# Patient Record
Sex: FEMALE | Race: WHITE | NOT HISPANIC OR LATINO | Employment: FULL TIME | URBAN - METROPOLITAN AREA
[De-identification: names, ages, dates, MRNs, and addresses within clinical notes are randomized per-mention and may not be internally consistent; named-entity substitution may affect disease eponyms.]

---

## 2017-06-27 ENCOUNTER — TELEPHONE (OUTPATIENT)
Dept: OBSTETRICS AND GYNECOLOGY | Facility: CLINIC | Age: 47
End: 2017-06-27

## 2017-06-28 ENCOUNTER — LAB VISIT (OUTPATIENT)
Dept: LAB | Facility: OTHER | Age: 47
End: 2017-06-28
Attending: OBSTETRICS & GYNECOLOGY
Payer: COMMERCIAL

## 2017-06-28 ENCOUNTER — OFFICE VISIT (OUTPATIENT)
Dept: OBSTETRICS AND GYNECOLOGY | Facility: CLINIC | Age: 47
End: 2017-06-28
Attending: OBSTETRICS & GYNECOLOGY
Payer: COMMERCIAL

## 2017-06-28 VITALS
SYSTOLIC BLOOD PRESSURE: 98 MMHG | WEIGHT: 144.19 LBS | BODY MASS INDEX: 21.85 KG/M2 | HEIGHT: 68 IN | DIASTOLIC BLOOD PRESSURE: 62 MMHG

## 2017-06-28 DIAGNOSIS — Z12.31 ENCOUNTER FOR SCREENING MAMMOGRAM FOR MALIGNANT NEOPLASM OF BREAST: Primary | ICD-10-CM

## 2017-06-28 DIAGNOSIS — Z20.2 POSSIBLE EXPOSURE TO STD: ICD-10-CM

## 2017-06-28 DIAGNOSIS — Z01.419 ENCOUNTER FOR GYNECOLOGICAL EXAMINATION (GENERAL) (ROUTINE) WITHOUT ABNORMAL FINDINGS: ICD-10-CM

## 2017-06-28 LAB
C TRACH DNA SPEC QL NAA+PROBE: NOT DETECTED
CANDIDA RRNA VAG QL PROBE: NEGATIVE
G VAGINALIS RRNA GENITAL QL PROBE: POSITIVE
N GONORRHOEA DNA SPEC QL NAA+PROBE: NOT DETECTED
RPR SER QL: NORMAL
T VAGINALIS RRNA GENITAL QL PROBE: NEGATIVE

## 2017-06-28 PROCEDURE — 86695 HERPES SIMPLEX TYPE 1 TEST: CPT

## 2017-06-28 PROCEDURE — 86592 SYPHILIS TEST NON-TREP QUAL: CPT

## 2017-06-28 PROCEDURE — 86703 HIV-1/HIV-2 1 RESULT ANTBDY: CPT

## 2017-06-28 PROCEDURE — 99396 PREV VISIT EST AGE 40-64: CPT | Mod: S$GLB,,, | Performed by: OBSTETRICS & GYNECOLOGY

## 2017-06-28 PROCEDURE — 99999 PR PBB SHADOW E&M-EST. PATIENT-LVL III: CPT | Mod: PBBFAC,,, | Performed by: OBSTETRICS & GYNECOLOGY

## 2017-06-28 PROCEDURE — 87340 HEPATITIS B SURFACE AG IA: CPT

## 2017-06-28 PROCEDURE — 36415 COLL VENOUS BLD VENIPUNCTURE: CPT

## 2017-06-28 RX ORDER — VARENICLINE TARTRATE 0.5 (11)-1
KIT ORAL
COMMUNITY
End: 2022-03-22

## 2017-06-28 NOTE — PROGRESS NOTES
Subjective:       Patient ID: Surjit Carranza is a 47 y.o. female.    Chief Complaint:  Well Woman (Last annual 2016 pap/hpv negative, Last mmg 2016 Bi-RADS 2 ) and STD CHECK (pt wanting std screening cultures and lab work )      Patient Active Problem List   Diagnosis    Tobacco use    Herpetic ulceration of vulva       History of Present Illness  47 y.o. yo  here for annual exam. No gyn complaints. Doing well. Wants STD testing. Condom fell off twice. Otherwise doing well    Patient had a normal pap smear and HPV in 2016 at last annual visit. I explained new guidelines. Will repeat pap and HPV every 3 years. Answered all questions. Patient agrees.     Past Medical History:   Diagnosis Date    Abnormal Pap smear of cervix     years ago     Anxiety     Chlamydia     Herpes simplex virus (HSV) infection     HPV (human papilloma virus) infection        Past Surgical History:   Procedure Laterality Date    CYST REMOVAL      PELVIC LAPAROSCOPY      TUBAL LIGATION         OB History    Para Term  AB Living   1       1     SAB TAB Ectopic Multiple Live Births       1          # Outcome Date GA Lbr Rasta/2nd Weight Sex Delivery Anes PTL Lv   1 Ectopic                   Patient's last menstrual period was 2017.   Date of Last Pap: 2016    Review of Systems  Review of Systems   Constitutional: Negative for fatigue and unexpected weight change.   Respiratory: Negative for shortness of breath.    Cardiovascular: Negative for chest pain.   Gastrointestinal: Negative for abdominal pain, constipation, diarrhea, nausea and vomiting.   Genitourinary: Negative for dysuria.   Musculoskeletal: Negative for back pain.   Skin: Negative for rash.   Neurological: Negative for headaches.   Hematological: Does not bruise/bleed easily.   Psychiatric/Behavioral: Negative for behavioral problems.        Objective:   Physical Exam:   Constitutional: She is oriented to person, place, and time.  Vital signs are normal. She appears well-developed and well-nourished. No distress.        Pulmonary/Chest: She exhibits no mass. Right breast exhibits no mass, no nipple discharge, no skin change, no tenderness, no bleeding and no swelling. Left breast exhibits no mass, no nipple discharge, no skin change, no tenderness, no bleeding and no swelling. Breasts are symmetrical.        Abdominal: Soft. Normal appearance and bowel sounds are normal. She exhibits no distension and no mass. There is no tenderness. There is no rebound.     Genitourinary: Vagina normal and uterus normal. There is no rash, tenderness, lesion or injury on the right labia. There is no rash, tenderness, lesion or injury on the left labia. Uterus is not deviated, not enlarged, not fixed, not tender, not hosting fibroids and not experiencing uterine prolapse. Cervix is normal. Right adnexum displays no mass, no tenderness and no fullness. Left adnexum displays no mass, no tenderness and no fullness. No erythema, tenderness, rectocele, cystocele or unspecified prolapse of vaginal walls in the vagina. No vaginal discharge found. Cervix exhibits no motion tenderness, no discharge and no friability.           Musculoskeletal: Normal range of motion and moves all extremeties.      Lymphadenopathy:     She has no axillary adenopathy.        Right: No supraclavicular adenopathy present.        Left: No supraclavicular adenopathy present.    Neurological: She is alert and oriented to person, place, and time.    Skin: Skin is warm and dry.    Psychiatric: She has a normal mood and affect. Her behavior is normal. Judgment normal.        Assessment/ Plan:     1. Encounter for screening mammogram for malignant neoplasm of breast  Mammo Digital Screening Bilat with Tomosynthesis CAD    Mammo Digital Screening Bilat with Tomosynthesis CAD   2. Possible exposure to STD  Vaginosis Screen by DNA Probe    C. trachomatis/N. gonorrhoeae by AMP DNA Cervix    HIV-1 and  HIV-2 antibodies    Hepatitis B surface antigen    RPR    Herpes simplex type 1&2 IgG,Herpes titer   3. Encounter for gynecological examination (general) (routine) without abnormal findings         Follow-up with me in 1 year

## 2017-06-29 LAB
HBV SURFACE AG SERPL QL IA: NEGATIVE
HIV 1+2 AB+HIV1 P24 AG SERPL QL IA: NEGATIVE

## 2017-06-30 LAB
HSV1 IGG SERPL QL IA: POSITIVE
HSV2 IGG SERPL QL IA: POSITIVE

## 2017-07-03 RX ORDER — METRONIDAZOLE 500 MG/1
500 TABLET ORAL EVERY 12 HOURS
Qty: 14 TABLET | Refills: 0 | Status: SHIPPED | OUTPATIENT
Start: 2017-07-03 | End: 2017-07-10

## 2017-08-28 ENCOUNTER — HOSPITAL ENCOUNTER (OUTPATIENT)
Dept: RADIOLOGY | Facility: HOSPITAL | Age: 47
Discharge: HOME | End: 2017-08-28
Attending: OBSTETRICS & GYNECOLOGY
Payer: COMMERCIAL

## 2017-08-28 PROCEDURE — 77067 SCR MAMMO BI INCL CAD: CPT | Mod: TC

## 2017-08-28 PROCEDURE — 77063 BREAST TOMOSYNTHESIS BI: CPT | Mod: 26,,, | Performed by: RADIOLOGY

## 2017-08-28 PROCEDURE — 77067 SCR MAMMO BI INCL CAD: CPT | Mod: 26,,, | Performed by: RADIOLOGY

## 2017-09-05 ENCOUNTER — TELEPHONE (OUTPATIENT)
Dept: OBSTETRICS AND GYNECOLOGY | Facility: CLINIC | Age: 47
End: 2017-09-05

## 2017-09-05 NOTE — TELEPHONE ENCOUNTER
Pt calling for mammogram results. Pt says she got an email from [facility] that she needed follow up but never heard anything from Dr Lugo.

## 2017-09-05 NOTE — TELEPHONE ENCOUNTER
Dr. Lugo I spoke with patient informed you were out of the office until tomorrow. Advised patient you will review her results and call her tomorrow. Patient said she got an e-mail saying she needs to follow up but didn't get a call from you or the radiology department. At her mammogram appointment they did not inform her of anything. Patient would like to be called after 4pm tomorrow and if she does not answer you can leave her a detailed message.

## 2017-09-07 NOTE — TELEPHONE ENCOUNTER
I called [facility] on the [facility] ([phone]) and left a message with the mammogram teach regarding patients mammogram results.

## 2017-09-12 ENCOUNTER — TELEPHONE (OUTPATIENT)
Dept: OBSTETRICS AND GYNECOLOGY | Facility: CLINIC | Age: 47
End: 2017-09-12

## 2017-09-12 NOTE — TELEPHONE ENCOUNTER
----- Message from Marisela Lee MA sent at 9/7/2017  5:12 PM CDT -----  Call [facility] again

## 2017-09-12 NOTE — TELEPHONE ENCOUNTER
I spoke with Maira at the [facility] imaging center and she informed me that the patient automatically gets that letter because her mammogram Bi-Rad was 0. They requested patients records and they should have them Thursday to compare if after comparison she needs additional views then they will contact patient to explain why.    Patient informed at this time she does not need to go back in for additional views they will contact her if she does based on comparison. Pt understands.

## 2017-10-18 ENCOUNTER — TELEPHONE (OUTPATIENT)
Dept: OBSTETRICS AND GYNECOLOGY | Facility: CLINIC | Age: 47
End: 2017-10-18

## 2017-10-18 RX ORDER — NITROFURANTOIN 25; 75 MG/1; MG/1
100 CAPSULE ORAL 2 TIMES DAILY
Qty: 14 CAPSULE | Refills: 0 | Status: SHIPPED | OUTPATIENT
Start: 2017-10-18 | End: 2017-10-25

## 2017-10-18 NOTE — TELEPHONE ENCOUNTER
Pt thinks she has a UTI. c/o Irritation, urinary frequency, and back ache.  appt offered, declined.  requesting a rx. Advised if not better in 3 days she should be seen.     Macrobid pended

## 2017-10-18 NOTE — TELEPHONE ENCOUNTER
Dr Lugo pt calling, pt thinks she has a UTI with irritation and frequency would like something called in.Pt # [phone]

## 2018-04-09 ENCOUNTER — TELEPHONE (OUTPATIENT)
Dept: OBSTETRICS AND GYNECOLOGY | Facility: CLINIC | Age: 48
End: 2018-04-09

## 2018-04-09 NOTE — TELEPHONE ENCOUNTER
Parish pt- thinks she is going through Menopause. Had first period since November and just got one last week, that's lasted 7 days. She feels anxious, is having hot flashes, mood swings and is unsure if she can discuss this at her annual in July or if you would want lab work, or if she should just come in for an appt.     Would you like me to get her on the schedule, or do you want labs, or both?

## 2018-04-09 NOTE — TELEPHONE ENCOUNTER
Parish pt states that in November was her last period, then pt started having a light period a week ago and lasted a week. Pt having anxiety, depression, and lower back pain. Pt thinks all of this is due to menopause.     [phone]

## 2018-04-10 NOTE — TELEPHONE ENCOUNTER
Returned call to pt to notify that Dr. Lugo would like her to come in for an appt for an evaluation so she can receive the appropriate care. Patient agrees, but she is only available on Mondays and Tuesdays and will only go to the [place] office. First Monday available is 5/7/18. Pt ok with this.    Scheduled 5/7/18

## 2018-07-12 ENCOUNTER — LAB VISIT (OUTPATIENT)
Dept: LAB | Facility: OTHER | Age: 48
End: 2018-07-12
Attending: OBSTETRICS & GYNECOLOGY
Payer: COMMERCIAL

## 2018-07-12 ENCOUNTER — OFFICE VISIT (OUTPATIENT)
Dept: OBSTETRICS AND GYNECOLOGY | Facility: CLINIC | Age: 48
End: 2018-07-12
Attending: OBSTETRICS & GYNECOLOGY
Payer: COMMERCIAL

## 2018-07-12 VITALS
HEIGHT: 68 IN | DIASTOLIC BLOOD PRESSURE: 86 MMHG | SYSTOLIC BLOOD PRESSURE: 126 MMHG | WEIGHT: 150.88 LBS | BODY MASS INDEX: 22.87 KG/M2

## 2018-07-12 DIAGNOSIS — Z11.3 SCREEN FOR STD (SEXUALLY TRANSMITTED DISEASE): ICD-10-CM

## 2018-07-12 DIAGNOSIS — R23.2 FLUSHING: ICD-10-CM

## 2018-07-12 DIAGNOSIS — Z01.419 ENCOUNTER FOR GYNECOLOGICAL EXAMINATION (GENERAL) (ROUTINE) WITHOUT ABNORMAL FINDINGS: ICD-10-CM

## 2018-07-12 DIAGNOSIS — Z11.3 SCREEN FOR STD (SEXUALLY TRANSMITTED DISEASE): Primary | ICD-10-CM

## 2018-07-12 DIAGNOSIS — Z12.31 ENCOUNTER FOR SCREENING MAMMOGRAM FOR MALIGNANT NEOPLASM OF BREAST: ICD-10-CM

## 2018-07-12 LAB
ESTRADIOL SERPL-MCNC: 50 PG/ML
FSH SERPL-ACNC: 75.1 MIU/ML
TSH SERPL DL<=0.005 MIU/L-ACNC: 1.12 UIU/ML

## 2018-07-12 PROCEDURE — 84443 ASSAY THYROID STIM HORMONE: CPT

## 2018-07-12 PROCEDURE — 99999 PR PBB SHADOW E&M-EST. PATIENT-LVL III: CPT | Mod: PBBFAC,,, | Performed by: OBSTETRICS & GYNECOLOGY

## 2018-07-12 PROCEDURE — 87340 HEPATITIS B SURFACE AG IA: CPT

## 2018-07-12 PROCEDURE — 99396 PREV VISIT EST AGE 40-64: CPT | Mod: S$GLB,,, | Performed by: OBSTETRICS & GYNECOLOGY

## 2018-07-12 PROCEDURE — 87491 CHLMYD TRACH DNA AMP PROBE: CPT

## 2018-07-12 PROCEDURE — 86592 SYPHILIS TEST NON-TREP QUAL: CPT

## 2018-07-12 PROCEDURE — 83001 ASSAY OF GONADOTROPIN (FSH): CPT

## 2018-07-12 PROCEDURE — 36415 COLL VENOUS BLD VENIPUNCTURE: CPT

## 2018-07-12 PROCEDURE — 86703 HIV-1/HIV-2 1 RESULT ANTBDY: CPT

## 2018-07-12 PROCEDURE — 87660 TRICHOMONAS VAGIN DIR PROBE: CPT

## 2018-07-12 PROCEDURE — 82670 ASSAY OF TOTAL ESTRADIOL: CPT

## 2018-07-12 NOTE — PROGRESS NOTES
Subjective:       Patient ID: Surjit Carranza is a 48 y.o. female.    Chief Complaint:  No chief complaint on file.      Patient Active Problem List   Diagnosis    Tobacco use    Herpetic ulceration of vulva       History of Present Illness  48 y.o. yo  here for annual exam. Periods have been spacing out. Reports hot flashes, tired, anxiety and some mood swings. Recently had a period and symptoms are not that bad right now. I explained labs will fluctuate with symptoms. Pt wants to draw labs now. All questions answered. Explained WHI study in detail, risks and benefits, alternatives including antidepressants.     Patient had a normal pap smear and HPV in 2016 at last annual visit. I explained new guidelines. Will repeat pap and HPV every 3 years. Answered all questions. Patient agrees.     Past Medical History:   Diagnosis Date    Abnormal Pap smear of cervix     years ago     Anxiety     Breast cyst     Chlamydia     Herpes simplex virus (HSV) infection     HPV (human papilloma virus) infection        Past Surgical History:   Procedure Laterality Date    CYST REMOVAL      PELVIC LAPAROSCOPY      TUBAL LIGATION         OB History    Para Term  AB Living   1   0   1     SAB TAB Ectopic Multiple Live Births       1          # Outcome Date GA Lbr Rasta/2nd Weight Sex Delivery Anes PTL Lv   1 Ectopic                   Patient's last menstrual period was 2018 (approximate).   Date of Last Pap: 2016    Review of Systems  Review of Systems   Constitutional: Negative for fatigue and unexpected weight change.   Respiratory: Negative for shortness of breath.    Cardiovascular: Negative for chest pain.   Gastrointestinal: Negative for abdominal pain, constipation, diarrhea, nausea and vomiting.   Genitourinary: Negative for dysuria.   Musculoskeletal: Negative for back pain.   Skin: Negative for rash.   Neurological: Negative for headaches.   Hematological: Does not bruise/bleed  easily.   Psychiatric/Behavioral: Negative for behavioral problems.        Objective:   Physical Exam:   Constitutional: She is oriented to person, place, and time. Vital signs are normal. She appears well-developed and well-nourished. No distress.        Pulmonary/Chest: She exhibits no mass. Right breast exhibits no mass, no nipple discharge, no skin change, no tenderness, no bleeding and no swelling. Left breast exhibits no mass, no nipple discharge, no skin change, no tenderness, no bleeding and no swelling. Breasts are symmetrical.        Abdominal: Soft. Normal appearance and bowel sounds are normal. She exhibits no distension and no mass. There is no tenderness. There is no rebound.     Genitourinary: Vagina normal and uterus normal. There is no rash, tenderness, lesion or injury on the right labia. There is no rash, tenderness, lesion or injury on the left labia. Uterus is not deviated, not enlarged, not fixed, not tender, not hosting fibroids and not experiencing uterine prolapse. Cervix is normal. Right adnexum displays no mass, no tenderness and no fullness. Left adnexum displays no mass, no tenderness and no fullness. No erythema, tenderness, rectocele, cystocele or unspecified prolapse of vaginal walls in the vagina. No vaginal discharge found. Cervix exhibits no motion tenderness, no discharge and no friability.           Musculoskeletal: Normal range of motion and moves all extremeties.      Lymphadenopathy:     She has no axillary adenopathy.        Right: No supraclavicular adenopathy present.        Left: No supraclavicular adenopathy present.    Neurological: She is alert and oriented to person, place, and time.    Skin: Skin is warm and dry.    Psychiatric: She has a normal mood and affect. Her behavior is normal. Judgment normal.        Assessment/ Plan:     1. Screen for STD (sexually transmitted disease)  HIV-1 and HIV-2 antibodies    RPR    C. trachomatis/N. gonorrhoeae by AMP DNA  Cervicovaginal    Hepatitis B surface antigen   2. Flushing  Follicle stimulating hormone    Estradiol    TSH   3. Encounter for screening mammogram for malignant neoplasm of breast  Mammo Digital Screening Bilat with Tomosynthesis CAD   4. Encounter for gynecological examination (general) (routine) without abnormal findings         Follow-up with me in 1 year

## 2018-07-13 LAB
C TRACH DNA SPEC QL NAA+PROBE: NOT DETECTED
CANDIDA RRNA VAG QL PROBE: NEGATIVE
G VAGINALIS RRNA GENITAL QL PROBE: NEGATIVE
HBV SURFACE AG SERPL QL IA: NEGATIVE
HIV 1+2 AB+HIV1 P24 AG SERPL QL IA: NEGATIVE
N GONORRHOEA DNA SPEC QL NAA+PROBE: NOT DETECTED
RPR SER QL: NORMAL
T VAGINALIS RRNA GENITAL QL PROBE: NEGATIVE

## 2018-09-04 ENCOUNTER — HOSPITAL ENCOUNTER (OUTPATIENT)
Dept: RADIOLOGY | Facility: HOSPITAL | Age: 48
Discharge: HOME | End: 2018-09-04
Attending: OBSTETRICS & GYNECOLOGY
Payer: COMMERCIAL

## 2018-09-04 VITALS — WEIGHT: 150 LBS | BODY MASS INDEX: 22.73 KG/M2 | HEIGHT: 68 IN

## 2018-09-04 DIAGNOSIS — Z12.31 ENCOUNTER FOR SCREENING MAMMOGRAM FOR MALIGNANT NEOPLASM OF BREAST: ICD-10-CM

## 2018-09-04 PROCEDURE — 77067 SCR MAMMO BI INCL CAD: CPT | Mod: TC

## 2018-09-04 PROCEDURE — 77063 BREAST TOMOSYNTHESIS BI: CPT | Mod: 26,,, | Performed by: RADIOLOGY

## 2018-09-04 PROCEDURE — 77067 SCR MAMMO BI INCL CAD: CPT | Mod: 26,,, | Performed by: RADIOLOGY

## 2018-09-20 ENCOUNTER — TELEPHONE (OUTPATIENT)
Dept: OBSTETRICS AND GYNECOLOGY | Facility: CLINIC | Age: 48
End: 2018-09-20

## 2018-09-20 NOTE — TELEPHONE ENCOUNTER
Parish pt - pt is going through pre-menopause and  told her to call if they symptoms got worse and she would send in an rx to help regulate her hormones.

## 2018-09-20 NOTE — TELEPHONE ENCOUNTER
C/o fatigue and anxiety/depression but takes klonopin PRN (usually about once or twice a month).  Denies hot flashes and night sweats.     She would like something to balance her hormones.

## 2018-09-21 RX ORDER — NORETHINDRONE ACETATE AND ETHINYL ESTRADIOL 1MG-20(21)
1 KIT ORAL DAILY
Qty: 28 TABLET | Refills: 6 | Status: SHIPPED | OUTPATIENT
Start: 2018-09-21 | End: 2023-12-18

## 2018-09-21 NOTE — TELEPHONE ENCOUNTER
If she is still  Having periods the best answer is for her to be on a low dose OCP so I sent in Loestrin. If she takes hormones it will make her have irregular bleeding. Was former smoker, make sure she quit and is not smoking

## 2019-05-06 ENCOUNTER — TELEPHONE (OUTPATIENT)
Dept: OBSTETRICS AND GYNECOLOGY | Facility: CLINIC | Age: 49
End: 2019-05-06

## 2019-05-06 NOTE — TELEPHONE ENCOUNTER
If she is so fatigued she cannot go to work she needs to be seen. Not something I can diagnose over the phone. Will likely need labs as well. Cannot take OCP due to smoking

## 2019-05-06 NOTE — TELEPHONE ENCOUNTER
Advised per Dr. Lugo, pt understands and is ok to come in. Scheduled with Dr. Lugo first available 5/13

## 2019-05-06 NOTE — TELEPHONE ENCOUNTER
Parish pt - reports she has been experiencing a good bit of fatigue, mood swings, night sweats, and depression. She had called about this very issue in September of 2018 and talked to Anamika, who advised per Dr. Lugo. A low dose OCP was sent in and Anamika left a VM to notify her and requested a call back to verbally tell the pt instructions on use. Called back once and states no one answered, she felt better so she just forgot about it. Now feels this way again, had to take leave from her job b/c she was so fatigued she could not go in.   Still smoking cigarettes.Last period around a month ago for 2 days; the cycle prior had been months before that   Would prefer to have something sent in and come in for her annual in July.   States insurance and copays have her in a bad spot.

## 2021-04-11 ENCOUNTER — HOSPITAL ENCOUNTER (EMERGENCY)
Facility: HOSPITAL | Age: 51
Discharge: HOME | End: 2021-04-11
Attending: EMERGENCY MEDICINE
Payer: MEDICAID

## 2021-04-11 VITALS
WEIGHT: 150 LBS | SYSTOLIC BLOOD PRESSURE: 115 MMHG | DIASTOLIC BLOOD PRESSURE: 66 MMHG | OXYGEN SATURATION: 100 % | RESPIRATION RATE: 18 BRPM | HEART RATE: 60 BPM | TEMPERATURE: 99 F | BODY MASS INDEX: 22.73 KG/M2 | HEIGHT: 68 IN

## 2021-04-11 DIAGNOSIS — R10.9 ACUTE RIGHT FLANK PAIN: Primary | ICD-10-CM

## 2021-04-11 DIAGNOSIS — T14.8XXA MUSCLE STRAIN: ICD-10-CM

## 2021-04-11 LAB
ALBUMIN SERPL BCP-MCNC: 4.1 G/DL (ref 3.5–5.2)
ALP SERPL-CCNC: 79 U/L (ref 55–135)
ALT SERPL W/O P-5'-P-CCNC: 10 U/L (ref 10–44)
ANION GAP SERPL CALC-SCNC: 10 MMOL/L (ref 8–16)
AST SERPL-CCNC: 16 U/L (ref 10–40)
BASOPHILS # BLD AUTO: 0.05 K/UL (ref 0–0.2)
BASOPHILS NFR BLD: 0.6 % (ref 0–1.9)
BILIRUB SERPL-MCNC: 0.5 MG/DL (ref 0.1–1)
BILIRUB UR QL STRIP: NEGATIVE
BILIRUB UR QL STRIP: NEGATIVE
BUN SERPL-MCNC: 20 MG/DL (ref 6–20)
CALCIUM SERPL-MCNC: 9.2 MG/DL (ref 8.7–10.5)
CHLORIDE SERPL-SCNC: 106 MMOL/L (ref 95–110)
CLARITY UR: CLEAR
CLARITY UR: CLEAR
CO2 SERPL-SCNC: 23 MMOL/L (ref 23–29)
COLOR UR: YELLOW
COLOR UR: YELLOW
CREAT SERPL-MCNC: 0.9 MG/DL (ref 0.5–1.4)
DIFFERENTIAL METHOD: ABNORMAL
EOSINOPHIL # BLD AUTO: 0.1 K/UL (ref 0–0.5)
EOSINOPHIL NFR BLD: 1.4 % (ref 0–8)
ERYTHROCYTE [DISTWIDTH] IN BLOOD BY AUTOMATED COUNT: 12.9 % (ref 11.5–14.5)
EST. GFR  (AFRICAN AMERICAN): >60 ML/MIN/1.73 M^2
EST. GFR  (NON AFRICAN AMERICAN): >60 ML/MIN/1.73 M^2
GLUCOSE SERPL-MCNC: 95 MG/DL (ref 70–110)
GLUCOSE UR QL STRIP: NEGATIVE
GLUCOSE UR QL STRIP: NEGATIVE
HCT VFR BLD AUTO: 40.6 % (ref 37–48.5)
HGB BLD-MCNC: 13.7 G/DL (ref 12–16)
HGB UR QL STRIP: NEGATIVE
HGB UR QL STRIP: NEGATIVE
IMM GRANULOCYTES # BLD AUTO: 0.02 K/UL (ref 0–0.04)
IMM GRANULOCYTES NFR BLD AUTO: 0.2 % (ref 0–0.5)
KETONES UR QL STRIP: NEGATIVE
KETONES UR QL STRIP: NEGATIVE
LEUKOCYTE ESTERASE UR QL STRIP: NEGATIVE
LEUKOCYTE ESTERASE UR QL STRIP: NEGATIVE
LYMPHOCYTES # BLD AUTO: 1.5 K/UL (ref 1–4.8)
LYMPHOCYTES NFR BLD: 17.5 % (ref 18–48)
MCH RBC QN AUTO: 30.4 PG (ref 27–31)
MCHC RBC AUTO-ENTMCNC: 33.7 G/DL (ref 32–36)
MCV RBC AUTO: 90 FL (ref 82–98)
MONOCYTES # BLD AUTO: 0.6 K/UL (ref 0.3–1)
MONOCYTES NFR BLD: 6.6 % (ref 4–15)
NEUTROPHILS # BLD AUTO: 6.3 K/UL (ref 1.8–7.7)
NEUTROPHILS NFR BLD: 73.7 % (ref 38–73)
NITRITE UR QL STRIP: NEGATIVE
NITRITE UR QL STRIP: NEGATIVE
NRBC BLD-RTO: 0 /100 WBC
PH UR STRIP: 6 [PH] (ref 5–8)
PH UR STRIP: 7 [PH] (ref 5–8)
PLATELET # BLD AUTO: 204 K/UL (ref 150–450)
PMV BLD AUTO: 10.7 FL (ref 9.2–12.9)
POTASSIUM SERPL-SCNC: 4.3 MMOL/L (ref 3.5–5.1)
PROT SERPL-MCNC: 7.1 G/DL (ref 6–8.4)
PROT UR QL STRIP: NEGATIVE
PROT UR QL STRIP: NEGATIVE
RBC # BLD AUTO: 4.51 M/UL (ref 4–5.4)
SODIUM SERPL-SCNC: 139 MMOL/L (ref 136–145)
SP GR UR STRIP: 1 (ref 1–1.03)
SP GR UR STRIP: 1.02 (ref 1–1.03)
URN SPEC COLLECT METH UR: NORMAL
URN SPEC COLLECT METH UR: NORMAL
UROBILINOGEN UR STRIP-ACNC: NEGATIVE EU/DL
UROBILINOGEN UR STRIP-ACNC: NEGATIVE EU/DL
WBC # BLD AUTO: 8.59 K/UL (ref 3.9–12.7)

## 2021-04-11 PROCEDURE — 85025 COMPLETE CBC W/AUTO DIFF WBC: CPT | Performed by: EMERGENCY MEDICINE

## 2021-04-11 PROCEDURE — 81003 URINALYSIS AUTO W/O SCOPE: CPT | Performed by: EMERGENCY MEDICINE

## 2021-04-11 PROCEDURE — 25000003 PHARM REV CODE 250: Performed by: EMERGENCY MEDICINE

## 2021-04-11 PROCEDURE — 96361 HYDRATE IV INFUSION ADD-ON: CPT

## 2021-04-11 PROCEDURE — 96374 THER/PROPH/DIAG INJ IV PUSH: CPT

## 2021-04-11 PROCEDURE — 63600175 PHARM REV CODE 636 W HCPCS: Performed by: EMERGENCY MEDICINE

## 2021-04-11 PROCEDURE — 99284 EMERGENCY DEPT VISIT MOD MDM: CPT | Mod: 25

## 2021-04-11 PROCEDURE — 80053 COMPREHEN METABOLIC PANEL: CPT | Performed by: EMERGENCY MEDICINE

## 2021-04-11 RX ORDER — HYDROCODONE BITARTRATE AND ACETAMINOPHEN 5; 325 MG/1; MG/1
1 TABLET ORAL EVERY 4 HOURS PRN
Qty: 12 TABLET | Refills: 0 | Status: SHIPPED | OUTPATIENT
Start: 2021-04-11 | End: 2021-04-21

## 2021-04-11 RX ORDER — PREDNISONE 20 MG/1
60 TABLET ORAL DAILY
Qty: 9 TABLET | Refills: 0 | Status: SHIPPED | OUTPATIENT
Start: 2021-04-11 | End: 2021-04-14

## 2021-04-11 RX ORDER — DEXAMETHASONE SODIUM PHOSPHATE 4 MG/ML
12 INJECTION, SOLUTION INTRA-ARTICULAR; INTRALESIONAL; INTRAMUSCULAR; INTRAVENOUS; SOFT TISSUE
Status: COMPLETED | OUTPATIENT
Start: 2021-04-11 | End: 2021-04-11

## 2021-04-11 RX ADMIN — SODIUM CHLORIDE 1000 ML: 0.9 INJECTION, SOLUTION INTRAVENOUS at 08:04

## 2021-04-11 RX ADMIN — DEXAMETHASONE SODIUM PHOSPHATE 12 MG: 4 INJECTION INTRA-ARTICULAR; INTRALESIONAL; INTRAMUSCULAR; INTRAVENOUS; SOFT TISSUE at 10:04

## 2021-10-06 ENCOUNTER — LAB VISIT (OUTPATIENT)
Dept: LAB | Facility: HOSPITAL | Age: 51
End: 2021-10-06
Attending: NURSE PRACTITIONER
Payer: MEDICAID

## 2021-10-06 DIAGNOSIS — R07.89 ATYPICAL CHEST PAIN: ICD-10-CM

## 2021-10-06 DIAGNOSIS — M54.6 ACUTE LEFT-SIDED THORACIC BACK PAIN: ICD-10-CM

## 2021-10-06 LAB
ALBUMIN SERPL BCP-MCNC: 4.2 G/DL (ref 3.5–5.2)
ALP SERPL-CCNC: 95 U/L (ref 55–135)
ALT SERPL W/O P-5'-P-CCNC: 13 U/L (ref 10–44)
ANION GAP SERPL CALC-SCNC: 9 MMOL/L (ref 8–16)
AST SERPL-CCNC: 18 U/L (ref 10–40)
BASOPHILS # BLD AUTO: 0.04 K/UL (ref 0–0.2)
BASOPHILS NFR BLD: 0.7 % (ref 0–1.9)
BILIRUB SERPL-MCNC: 1 MG/DL (ref 0.1–1)
BUN SERPL-MCNC: 11 MG/DL (ref 6–20)
CALCIUM SERPL-MCNC: 9.9 MG/DL (ref 8.7–10.5)
CHLORIDE SERPL-SCNC: 104 MMOL/L (ref 95–110)
CHOLEST SERPL-MCNC: 218 MG/DL (ref 120–199)
CHOLEST/HDLC SERPL: 3.8 {RATIO} (ref 2–5)
CO2 SERPL-SCNC: 27 MMOL/L (ref 23–29)
CREAT SERPL-MCNC: 1.1 MG/DL (ref 0.5–1.4)
D DIMER PPP IA.FEU-MCNC: 0.23 MG/L FEU
DIFFERENTIAL METHOD: NORMAL
EOSINOPHIL # BLD AUTO: 0.1 K/UL (ref 0–0.5)
EOSINOPHIL NFR BLD: 2.4 % (ref 0–8)
ERYTHROCYTE [DISTWIDTH] IN BLOOD BY AUTOMATED COUNT: 12.9 % (ref 11.5–14.5)
EST. GFR  (AFRICAN AMERICAN): >60 ML/MIN/1.73 M^2
EST. GFR  (NON AFRICAN AMERICAN): 59 ML/MIN/1.73 M^2
GLUCOSE SERPL-MCNC: 79 MG/DL (ref 70–110)
HCT VFR BLD AUTO: 44.6 % (ref 37–48.5)
HDLC SERPL-MCNC: 57 MG/DL (ref 40–75)
HDLC SERPL: 26.1 % (ref 20–50)
HGB BLD-MCNC: 14.6 G/DL (ref 12–16)
IMM GRANULOCYTES # BLD AUTO: 0.01 K/UL (ref 0–0.04)
IMM GRANULOCYTES NFR BLD AUTO: 0.2 % (ref 0–0.5)
LDLC SERPL CALC-MCNC: 137.4 MG/DL (ref 63–159)
LYMPHOCYTES # BLD AUTO: 1.9 K/UL (ref 1–4.8)
LYMPHOCYTES NFR BLD: 32.3 % (ref 18–48)
MCH RBC QN AUTO: 30.7 PG (ref 27–31)
MCHC RBC AUTO-ENTMCNC: 32.7 G/DL (ref 32–36)
MCV RBC AUTO: 94 FL (ref 82–98)
MONOCYTES # BLD AUTO: 0.4 K/UL (ref 0.3–1)
MONOCYTES NFR BLD: 7.3 % (ref 4–15)
NEUTROPHILS # BLD AUTO: 3.3 K/UL (ref 1.8–7.7)
NEUTROPHILS NFR BLD: 57.1 % (ref 38–73)
NONHDLC SERPL-MCNC: 161 MG/DL
NRBC BLD-RTO: 0 /100 WBC
PLATELET # BLD AUTO: 235 K/UL (ref 150–450)
PMV BLD AUTO: 11.9 FL (ref 9.2–12.9)
POTASSIUM SERPL-SCNC: 4.2 MMOL/L (ref 3.5–5.1)
PROT SERPL-MCNC: 7.5 G/DL (ref 6–8.4)
RBC # BLD AUTO: 4.75 M/UL (ref 4–5.4)
SODIUM SERPL-SCNC: 140 MMOL/L (ref 136–145)
TRIGL SERPL-MCNC: 118 MG/DL (ref 30–150)
TSH SERPL DL<=0.005 MIU/L-ACNC: 1.27 UIU/ML (ref 0.4–4)
WBC # BLD AUTO: 5.79 K/UL (ref 3.9–12.7)

## 2021-10-06 PROCEDURE — 85025 COMPLETE CBC W/AUTO DIFF WBC: CPT | Performed by: NURSE PRACTITIONER

## 2021-10-06 PROCEDURE — 84443 ASSAY THYROID STIM HORMONE: CPT | Performed by: NURSE PRACTITIONER

## 2021-10-06 PROCEDURE — 85379 FIBRIN DEGRADATION QUANT: CPT | Performed by: NURSE PRACTITIONER

## 2021-10-06 PROCEDURE — 80061 LIPID PANEL: CPT | Performed by: NURSE PRACTITIONER

## 2021-10-06 PROCEDURE — 80053 COMPREHEN METABOLIC PANEL: CPT | Performed by: NURSE PRACTITIONER

## 2022-03-22 ENCOUNTER — HOSPITAL ENCOUNTER (EMERGENCY)
Facility: HOSPITAL | Age: 52
Discharge: HOME | End: 2022-03-22
Attending: EMERGENCY MEDICINE
Payer: MEDICAID

## 2022-03-22 VITALS
SYSTOLIC BLOOD PRESSURE: 110 MMHG | HEIGHT: 68 IN | HEART RATE: 59 BPM | TEMPERATURE: 98 F | WEIGHT: 155 LBS | DIASTOLIC BLOOD PRESSURE: 67 MMHG | RESPIRATION RATE: 18 BRPM | BODY MASS INDEX: 23.49 KG/M2 | OXYGEN SATURATION: 99 %

## 2022-03-22 DIAGNOSIS — R10.9 ABDOMINAL PAIN, UNSPECIFIED ABDOMINAL LOCATION: Primary | ICD-10-CM

## 2022-03-22 DIAGNOSIS — R10.10 UPPER ABDOMINAL PAIN: ICD-10-CM

## 2022-03-22 LAB
ALBUMIN SERPL BCP-MCNC: 3.9 G/DL (ref 3.5–5.2)
ALP SERPL-CCNC: 93 U/L (ref 55–135)
ALT SERPL W/O P-5'-P-CCNC: 18 U/L (ref 10–44)
ANION GAP SERPL CALC-SCNC: 10 MMOL/L (ref 8–16)
AST SERPL-CCNC: 21 U/L (ref 10–40)
BASOPHILS # BLD AUTO: 0.02 K/UL (ref 0–0.2)
BASOPHILS NFR BLD: 0.3 % (ref 0–1.9)
BILIRUB SERPL-MCNC: 1.5 MG/DL (ref 0.1–1)
BILIRUB UR QL STRIP: NEGATIVE
BUN SERPL-MCNC: 14 MG/DL (ref 6–20)
CALCIUM SERPL-MCNC: 9.4 MG/DL (ref 8.7–10.5)
CHLORIDE SERPL-SCNC: 101 MMOL/L (ref 95–110)
CLARITY UR: CLEAR
CO2 SERPL-SCNC: 24 MMOL/L (ref 23–29)
COLOR UR: YELLOW
CREAT SERPL-MCNC: 1 MG/DL (ref 0.5–1.4)
DIFFERENTIAL METHOD: NORMAL
EOSINOPHIL # BLD AUTO: 0.1 K/UL (ref 0–0.5)
EOSINOPHIL NFR BLD: 1.8 % (ref 0–8)
ERYTHROCYTE [DISTWIDTH] IN BLOOD BY AUTOMATED COUNT: 13.3 % (ref 11.5–14.5)
EST. GFR  (AFRICAN AMERICAN): >60 ML/MIN/1.73 M^2
EST. GFR  (NON AFRICAN AMERICAN): >60 ML/MIN/1.73 M^2
GLUCOSE SERPL-MCNC: 90 MG/DL (ref 70–110)
GLUCOSE UR QL STRIP: NEGATIVE
HCT VFR BLD AUTO: 45.4 % (ref 37–48.5)
HGB BLD-MCNC: 14.6 G/DL (ref 12–16)
HGB UR QL STRIP: NEGATIVE
IMM GRANULOCYTES # BLD AUTO: 0.02 K/UL (ref 0–0.04)
IMM GRANULOCYTES NFR BLD AUTO: 0.3 % (ref 0–0.5)
KETONES UR QL STRIP: ABNORMAL
LACTATE SERPL-SCNC: 0.8 MMOL/L (ref 0.5–2.2)
LEUKOCYTE ESTERASE UR QL STRIP: NEGATIVE
LYMPHOCYTES # BLD AUTO: 1.4 K/UL (ref 1–4.8)
LYMPHOCYTES NFR BLD: 19.4 % (ref 18–48)
MCH RBC QN AUTO: 30.2 PG (ref 27–31)
MCHC RBC AUTO-ENTMCNC: 32.2 G/DL (ref 32–36)
MCV RBC AUTO: 94 FL (ref 82–98)
MONOCYTES # BLD AUTO: 0.8 K/UL (ref 0.3–1)
MONOCYTES NFR BLD: 10.9 % (ref 4–15)
NEUTROPHILS # BLD AUTO: 4.8 K/UL (ref 1.8–7.7)
NEUTROPHILS NFR BLD: 67.3 % (ref 38–73)
NITRITE UR QL STRIP: NEGATIVE
NRBC BLD-RTO: 0 /100 WBC
PH UR STRIP: 6 [PH] (ref 5–8)
PLATELET # BLD AUTO: 210 K/UL (ref 150–450)
PMV BLD AUTO: 11.3 FL (ref 9.2–12.9)
POTASSIUM SERPL-SCNC: 4.2 MMOL/L (ref 3.5–5.1)
PROT SERPL-MCNC: 7.1 G/DL (ref 6–8.4)
PROT UR QL STRIP: ABNORMAL
RBC # BLD AUTO: 4.83 M/UL (ref 4–5.4)
SODIUM SERPL-SCNC: 135 MMOL/L (ref 136–145)
SP GR UR STRIP: 1.02 (ref 1–1.03)
URN SPEC COLLECT METH UR: ABNORMAL
UROBILINOGEN UR STRIP-ACNC: NEGATIVE EU/DL
WBC # BLD AUTO: 7.16 K/UL (ref 3.9–12.7)

## 2022-03-22 PROCEDURE — 81003 URINALYSIS AUTO W/O SCOPE: CPT | Performed by: PHYSICIAN ASSISTANT

## 2022-03-22 PROCEDURE — 63600175 PHARM REV CODE 636 W HCPCS: Performed by: PHYSICIAN ASSISTANT

## 2022-03-22 PROCEDURE — 25500020 PHARM REV CODE 255: Performed by: EMERGENCY MEDICINE

## 2022-03-22 PROCEDURE — 93010 ELECTROCARDIOGRAM REPORT: CPT | Mod: ,,, | Performed by: INTERNAL MEDICINE

## 2022-03-22 PROCEDURE — 93010 EKG 12-LEAD: ICD-10-PCS | Mod: ,,, | Performed by: INTERNAL MEDICINE

## 2022-03-22 PROCEDURE — 83605 ASSAY OF LACTIC ACID: CPT | Performed by: PHYSICIAN ASSISTANT

## 2022-03-22 PROCEDURE — 96361 HYDRATE IV INFUSION ADD-ON: CPT

## 2022-03-22 PROCEDURE — 80053 COMPREHEN METABOLIC PANEL: CPT | Performed by: PHYSICIAN ASSISTANT

## 2022-03-22 PROCEDURE — 96360 HYDRATION IV INFUSION INIT: CPT

## 2022-03-22 PROCEDURE — 85025 COMPLETE CBC W/AUTO DIFF WBC: CPT | Performed by: PHYSICIAN ASSISTANT

## 2022-03-22 PROCEDURE — 93005 ELECTROCARDIOGRAM TRACING: CPT

## 2022-03-22 PROCEDURE — 99285 EMERGENCY DEPT VISIT HI MDM: CPT | Mod: 25

## 2022-03-22 RX ORDER — DICYCLOMINE HYDROCHLORIDE 20 MG/1
20 TABLET ORAL 2 TIMES DAILY
Qty: 20 TABLET | Refills: 0 | Status: SHIPPED | OUTPATIENT
Start: 2022-03-22 | End: 2022-04-21

## 2022-03-22 RX ORDER — ONDANSETRON 4 MG/1
4 TABLET, FILM COATED ORAL EVERY 6 HOURS
Qty: 12 TABLET | Refills: 0 | Status: SHIPPED | OUTPATIENT
Start: 2022-03-22

## 2022-03-22 RX ADMIN — SODIUM CHLORIDE, SODIUM LACTATE, POTASSIUM CHLORIDE, AND CALCIUM CHLORIDE 1000 ML: .6; .31; .03; .02 INJECTION, SOLUTION INTRAVENOUS at 03:03

## 2022-03-22 RX ADMIN — IOHEXOL 75 ML: 350 INJECTION, SOLUTION INTRAVENOUS at 05:03

## 2022-03-22 NOTE — Clinical Note
"Surjit Bower" Dillon was seen and treated in our emergency department on 3/22/2022.  She may return to work on 03/24/2022.       If you have any questions or concerns, please don't hesitate to call.      TRACY Lindsay LPN    "

## 2022-03-22 NOTE — DISCHARGE INSTRUCTIONS
Use Zofran for nausea and Bentyl for abdominal pain  Follow-up with your primary care doctor  Return to the ED is needed    Thank you for coming to our Emergency Department today. It is important to remember that some problems are difficult to diagnose and may not be found during your Emergency Department visit. Be sure to follow up with your primary care doctor and review all labs/imaging/tests that were performed during this visit with them. Some labs/tests may be outside of the normal range and require non-emergent follow-up and further investigation to help diagnose/exclude/prevent complications or other medical conditions.    If you do not have a primary care doctor, you may contact the one listed on your discharge paperwork or you may also call the [facility] Appointment Desk at [phone] to schedule an appointment and establish care with one. It is important to your health that you have a primary care doctor.    Please take all medications as directed. All medications may potentially have side-effects and it is impossible to predict which medications may give you side-effects or what side-effects (if any) they will give you.. If you feel that you are having a negative effect or side-effect of any medication you should immediately stop taking them and seek medical attention. If you feel that you are having a life-threatening reaction call 911.    Return to the ER with any questions/concerns, new/concerning symptoms, worsening or failure to improve.     Do not drive, swim, climb to height, take a bath or make any important decisions for 24 hours if you have received any pain medications, sedatives or mood altering drugs during your ER visit.

## 2022-03-22 NOTE — ED PROVIDER NOTES
Encounter Date: 3/22/2022       History     Chief Complaint   Patient presents with    Abdominal Pain     Sent from PCP ,RLQ abd pain started yesterday at 1800.Nausea ,no vomiting.      CC: Abdominal Pain    Pt is a 52 y/o female with PMH of Tubal ligation and ovarian cyst removal (both procedures - 10 years ago) presenting to the ED with constant worsening RLQ abdominal pain that began 1 day ago.  Pt was seen by Dr. Clancy (Primary care) this AM and was instructed to go to the ED for further evaluation.  Pt has not eaten anything for 1 day due to nausea and diarrhea.  Pt has been able to drink liquids.  Has not taken anything for her symptoms.      Pt has also been experiencing dysuria and pelvic pressure for the last 2 days.  Denies hematuria, discharge, concern for STI.  Pt is currently going through menopause. Denies back pain.      Denies: SOB, cough, leg pain, leg swelling, being stationary for extended periods of time, not on hormonal therapy, no history of cancer, fever, vomiting, fatigue, weight loss, vision changes, HA, lightheadedness, dizziness, constipation            Review of patient's allergies indicates:  No Known Allergies  Past Medical History:   Diagnosis Date    Abnormal Pap smear of cervix     years ago     Anxiety     Breast cyst     Chlamydia     Depression     Herpes simplex virus (HSV) infection     HPV (human papilloma virus) infection      Past Surgical History:   Procedure Laterality Date    CYST REMOVAL      PELVIC LAPAROSCOPY      TUBAL LIGATION       Family History   Problem Relation Age of Onset    Breast cancer Neg Hx     Colon cancer Neg Hx     Ovarian cancer Neg Hx      Social History     Tobacco Use    Smoking status: Current Every Day Smoker     Packs/day: 0.50    Smokeless tobacco: Former User   Substance Use Topics    Alcohol use: Yes     Comment: socially    Drug use: No     Review of Systems   Constitutional: Positive for appetite change. Negative for  activity change, chills, diaphoresis, fatigue, fever and unexpected weight change.   HENT: Negative for trouble swallowing and voice change.    Respiratory: Negative for cough and shortness of breath.    Cardiovascular: Negative for chest pain, palpitations and leg swelling.   Gastrointestinal: Positive for abdominal pain. Negative for abdominal distention, anal bleeding, blood in stool, constipation, diarrhea, nausea, rectal pain and vomiting.   Genitourinary: Positive for dysuria. Negative for dyspareunia, hematuria, pelvic pain, vaginal bleeding, vaginal discharge and vaginal pain.   Musculoskeletal: Negative for myalgias, neck pain and neck stiffness.   Skin: Negative for rash.   Neurological: Negative for dizziness, weakness, light-headedness, numbness and headaches.         Physical Exam     Initial Vitals [03/22/22 1453]   BP Pulse Resp Temp SpO2   127/81 67 18 98.2 °F (36.8 °C) 99 %      MAP       --         Physical Exam    Nursing note and vitals reviewed.  Constitutional: Vital signs are normal. She appears well-developed and well-nourished. She is not diaphoretic.  Non-toxic appearance. No distress.   HENT:   Head: Normocephalic and atraumatic.   Right Ear: External ear normal.   Left Ear: External ear normal.   Nose: Nose normal.   Mouth/Throat: Uvula is midline, oropharynx is clear and moist and mucous membranes are normal. Mucous membranes are not cyanotic.   Eyes: Conjunctivae and EOM are normal. Pupils are equal, round, and reactive to light.   Neck: Neck supple. No tracheal deviation present.   Normal range of motion.  Cardiovascular: Normal rate, regular rhythm, normal heart sounds, intact distal pulses and normal pulses. PMI is not displaced.    Pulmonary/Chest: Effort normal and breath sounds normal.   Abdominal: Abdomen is soft and flat. Bowel sounds are normal. She exhibits no shifting dullness, no distension, no fluid wave, no pulsatile midline mass and no mass. There is no splenomegaly or  hepatomegaly. There is abdominal tenderness in the right lower quadrant. No hernia.   No right CVA tenderness.  No left CVA tenderness. There is no rebound, no guarding, no tenderness at McBurney's point and negative Cloud's sign. negative obturator sign, negative psoas sign and negative Rovsing's sign  Musculoskeletal:         General: Normal range of motion.      Cervical back: Normal range of motion and neck supple.     Neurological: She is alert and oriented to person, place, and time. She has normal strength and normal reflexes. She is not disoriented. No cranial nerve deficit or sensory deficit. GCS score is 15. GCS eye subscore is 4. GCS verbal subscore is 5. GCS motor subscore is 6.   Skin: Skin is warm and dry. Capillary refill takes less than 2 seconds.   Psychiatric: She has a normal mood and affect. Judgment and thought content normal.         ED Course   Procedures  Labs Reviewed   COMPREHENSIVE METABOLIC PANEL - Abnormal; Notable for the following components:       Result Value    Sodium 135 (*)     Total Bilirubin 1.5 (*)     All other components within normal limits   URINALYSIS, REFLEX TO URINE CULTURE - Abnormal; Notable for the following components:    Protein, UA Trace (*)     Ketones, UA Trace (*)     All other components within normal limits    Narrative:     Specimen Source->Urine   CBC W/ AUTO DIFFERENTIAL   LACTIC ACID, PLASMA          Imaging Results          CT Abdomen Pelvis With Contrast (Final result)  Result time 03/22/22 17:30:22    Final result by Salma Chilel MD (03/22/22 17:30:22)                 Impression:      Long segment abnormal distal small bowel wall thickening with increased mucosal enhancement consistent with acute enteritis, possibly infectious or inflammatory in etiology.      Electronically signed by: Salma Chilel MD  Date:    03/22/2022  Time:    17:30             Narrative:    EXAMINATION:  CT ABDOMEN PELVIS WITH CONTRAST    CLINICAL HISTORY:  RLQ abdominal  pain (Age >= 14y);RLQ pain;    TECHNIQUE:  Low dose axial images, sagittal and coronal reformations were obtained from the lung bases to the pubic symphysis following the IV administration of 75 mL of Omnipaque 350 .  Oral contrast was not given.    COMPARISON:  CT abdomen and pelvis from April 2021.    FINDINGS:  The visualized portion of the heart is unremarkable.  The lung bases are clear.    Right hepatic lobe cyst is visualized.  There is no intra-or extrahepatic biliary ductal dilatation.  The gallbladder is unremarkable.  The stomach, pancreas, spleen, and adrenal glands are unremarkable.    Kidneys, ureters, urinary bladder, and uterus show no significant abnormalities.  No hydronephrosis.    Appendix is visualized and is unremarkable.  Long segment abnormal distal small bowel wall thickening with increased mucosal enhancement is seen.  No significant surrounding inflammatory changes are appreciated.  Colon shows no significant abnormalities, however noting some liquid stool seen in the right colon.  No free air or free fluid.    Aorta tapers normally.    No acute osseous abnormality identified. Subcutaneous soft tissue show no significant abnormalities.                                 Medications   lactated ringers bolus 1,000 mL (1,000 mLs Intravenous New Bag 3/22/22 1536)   iohexoL (OMNIPAQUE 350) injection 75 mL (75 mLs Intravenous Given 3/22/22 1700)     Medical Decision Making:   History:   Old Medical Records: I decided to obtain old medical records.  Old Records Summarized: records from clinic visits.  Initial Assessment:   51-year-old female with abdominal pain referred to the ED from clinic  Differential Diagnosis:   Gastroenteritis, gastritis, appendicitis, ileus, bowel obstruction  Clinical Tests:   Lab Tests: Ordered and Reviewed  Radiological Study: Ordered and Reviewed  ED Management:  Plan:  Labs and CT scan    Patient did not want anything for the pain.  Labs revealed no acute findings.  No  leukocytosis.  Slightly elevated bilirubin, no tenderness to the right upper quadrant  Lactic acid within normal ranges, no acute findings on the urinalysis  CT scan shows findings of enteritis.   Patient patient's symptoms, labs, vital signs and assessment I suspect a viral etiology.  No indication for antibiotics at this time.  Patient's abdomen is tender to the lower abdomen, she does not have pelvic tender or adnexal fullness to suggest a female  etiology.   Will treat with Zofran and Bentyl.  Recommended follow-up with PCP.  Return precautions given.                      Clinical Impression:   Final diagnoses:  [R10.10] Upper abdominal pain  [R10.9] Abdominal pain, unspecified abdominal location (Primary)          ED Disposition Condition    Discharge Stable        ED Prescriptions     Medication Sig Dispense Start Date End Date Auth. Provider    dicyclomine (BENTYL) 20 mg tablet Take 1 tablet (20 mg total) by mouth 2 (two) times daily. 20 tablet 3/22/2022 4/21/2022 Corby Mack PA-C    ondansetron (ZOFRAN) 4 MG tablet Take 1 tablet (4 mg total) by mouth every 6 (six) hours. 12 tablet 3/22/2022  Corby Mack PA-C        Follow-up Information    None          Corby Mack PA-C  03/22/22 8819

## 2022-04-05 ENCOUNTER — LAB VISIT (OUTPATIENT)
Dept: LAB | Facility: HOSPITAL | Age: 52
End: 2022-04-05
Attending: NURSE PRACTITIONER
Payer: MEDICAID

## 2022-04-05 DIAGNOSIS — R07.89 ATYPICAL CHEST PAIN: ICD-10-CM

## 2022-04-05 DIAGNOSIS — Z11.3 SCREENING EXAMINATION FOR SEXUALLY TRANSMITTED DISEASE: ICD-10-CM

## 2022-04-05 DIAGNOSIS — M54.6 ACUTE LEFT-SIDED THORACIC BACK PAIN: ICD-10-CM

## 2022-04-05 PROCEDURE — 86592 SYPHILIS TEST NON-TREP QUAL: CPT | Performed by: NURSE PRACTITIONER

## 2022-04-05 PROCEDURE — 86803 HEPATITIS C AB TEST: CPT | Performed by: NURSE PRACTITIONER

## 2022-04-05 PROCEDURE — 87389 HIV-1 AG W/HIV-1&-2 AB AG IA: CPT | Performed by: NURSE PRACTITIONER

## 2022-04-06 LAB — HIV 1+2 AB+HIV1 P24 AG SERPL QL IA: NEGATIVE

## 2022-04-07 LAB
HCV AB SERPL QL IA: NEGATIVE
RPR SER QL: NORMAL

## 2022-10-05 ENCOUNTER — HOSPITAL ENCOUNTER (OUTPATIENT)
Dept: CARDIOLOGY | Facility: HOSPITAL | Age: 52
Discharge: HOME | End: 2022-10-05
Attending: NURSE PRACTITIONER
Payer: MEDICAID

## 2022-10-05 ENCOUNTER — HOSPITAL ENCOUNTER (OUTPATIENT)
Dept: RADIOLOGY | Facility: HOSPITAL | Age: 52
Discharge: HOME | End: 2022-10-05
Attending: NURSE PRACTITIONER
Payer: MEDICAID

## 2022-10-05 DIAGNOSIS — R07.9 CHEST PAIN, UNSPECIFIED TYPE: ICD-10-CM

## 2022-10-05 PROCEDURE — 93005 ELECTROCARDIOGRAM TRACING: CPT

## 2022-10-05 PROCEDURE — 71046 XR CHEST PA AND LATERAL: ICD-10-PCS | Mod: 26,,, | Performed by: RADIOLOGY

## 2022-10-05 PROCEDURE — 71046 X-RAY EXAM CHEST 2 VIEWS: CPT | Mod: TC,FY

## 2022-10-05 PROCEDURE — 93010 ELECTROCARDIOGRAM REPORT: CPT | Mod: ,,, | Performed by: INTERNAL MEDICINE

## 2022-10-05 PROCEDURE — 93010 EKG 12-LEAD: ICD-10-PCS | Mod: ,,, | Performed by: INTERNAL MEDICINE

## 2022-10-05 PROCEDURE — 71046 X-RAY EXAM CHEST 2 VIEWS: CPT | Mod: 26,,, | Performed by: RADIOLOGY

## 2022-10-05 NOTE — PROGRESS NOTES
Subjective:     Encounter Date:06/20/2022      Patient ID: Layo Cee is a 82 y.o. male.    Chief Complaint:  Chief Complaint   Patient presents with   • Atrial Fibrillation   • Shortness of Breath       HPI:  Mr Cee is an 81yo patient of Dr. Stevenson who is referred for evaluation of persistent atrial fibrillation.  His past medical history is significant for HTN, HLD, CAD with an MI in 2016, CABG and a stent in 6/2021.  He also has CKD stage 3.  An ablation for typical atrial flutter was in 2018.  He did well until 4/2022 when he was admitted to [facility] with AF with RVR and heart failure.  He was rate controlled, anticoagulated and diuresed.  He was seen in followup by Dr. Soliz and had a cardioversion 5/12/2022 was initially successful but he reverted back to AF shortly afterward.  The AF has persisted with rates that are high at times.  He has been very symptomatic with marked fatigue, poor stamina, palpitations and exertional dypsnea.  He is no longer to be as active as he once was.     An echo 5/2022 showed mild-moderate concentric LVH with an EF of 52%, moderate MR with mod-severe LAE, mild TR with KORIN.  There was borderline RV enlargement.     A cath 6/2021 showed  Findings:  1. Coronary Artery Anatomy:  Dominance: codominant  Left Main: angiographically normal, 4.5-5.0 mm diameter vessel that bifurcates distally into the LAD and circumflex, no dampening or ventricularization of the pressure waveform upon engagement of the vessel  Left Anterior Descending: diffuse disease throughout the proximal-mid LAD with up to 80% stenosis; competitive flow was observed to the distal LAD from the patient's LIMA graft  Left Circumflex Artery: codominant vessel with focal 40-50% stenosis in the proximal circumflex and a tubular 50-60% stenosis in the distal circumflex; left-sided PDA contains a focal 70% stenosis in its proximal segment  Right Coronary Artery: severely diffusely diseased and  Please let Ms Carranza know her chest xray is clear and normal chronically occluded distally; left-to-right collaterals were observed to the distal RCA system     2. Graft Study:  LIMA-LAD: widely patent throughout with no significant disease in the native LAD distal to the graft anastomosis  SVG-OM: chronically occluded at ostium  SVG-RCA: chronically occluded at ostium     3. Hemodynamics:  Left Ventricle: 155/0, LVEDP=12 mmHg  Aorta: 160/81 mmHg (opening pressure), 176/67 (closing pressure)     4. Left Ventriculogram:  Ejection Fraction: 65-70%  Wall Motion: mild inferior hypokinesis  Mitral Regurgitation: trace     On 6/3/2022 he was taken to the EP lab and had the followin. Successful pulmonary vein isolation with demonstration of entrance and exit block  2. Isolation of the posterior wall  3. Creation of a roof line  4. Extensive left atrial substrate modification as described above  5. Confirmation of bidirectional conduction block across the cavotricuspid isthmus  6. Prolonged corrected sinus node recovery time  7. Normal AV node conduction  8. Normal His Purkinje conduction    Several days following his ablation he was admitted to [facility] with AF with RVR and heart failure.  He was rate controlled and diuresed and discharged after several days.  Since then, he has continued to have marked fatigue and exertional dyspnea.      The following portions of the patient's history were reviewed and updated as appropriate: allergies, current medications, past family history, past medical history, past social history, past surgical history and problem list.    Problem List:  Patient Active Problem List   Diagnosis   • Unstable angina (HCC)   • Coronary artery disease of bypass graft of native heart with stable angina pectoris (HCC)   • Class 1 obesity due to excess calories with serious comorbidity and body mass index (BMI) of 33.0 to 33.9 in adult   • Gout   • Typical atrial flutter (HCC)   • Essential hypertension   • Frequent PVCs   • Mixed hyperlipidemia   •  Rotator cuff Bursitis of shoulder region   • Lateral epicondylitis   • History of hematuria   • Benign prostatic hyperplasia with urinary frequency   • Hydrocele in adult   • History of COVID-19   • Allergic rhinitis   • Seasonal allergies   • Chronic systolic congestive heart failure (HCC)   • Tobacco abuse, in remission   • Vitamin D deficiency   • Gastroesophageal reflux disease with esophagitis without hemorrhage   • Ischemic cardiomyopathy   • Centrilobular emphysema (HCC)   • Gastroesophageal reflux disease   • Atrial fibrillation, persistent (HCC)   • COPD (chronic obstructive pulmonary disease) (HCC)   • Chronic anticoagulation   • Diastolic dysfunction   • S/P CABG (coronary artery bypass graft)   • Stage 3a chronic kidney disease (HCC)   • Persistent atrial fibrillation (HCC)   • S/P ablation of atrial fibrillation   • Atrial fibrillation, unspecified type (HCC)   • Elevated troponin   • Acute on chronic diastolic CHF (congestive heart failure) (HCC)   • SOB (shortness of breath)       Active Med List:    Current Outpatient Medications:   •  acetaminophen (TYLENOL) 325 MG tablet, Take 650 mg by mouth Every 6 (Six) Hours As Needed for Mild Pain ., Disp: , Rfl:   •  amiodarone (PACERONE) 200 MG tablet, Take 1 tablet by mouth 2 (Two) Times a Day., Disp: 60 tablet, Rfl: 0  •  calcium carbonate (OS-LIANNA) 600 MG tablet, Take 600 mg by mouth Every Night., Disp: , Rfl:   •  clopidogrel (PLAVIX) 75 MG tablet, Take 1 tablet by mouth Daily., Disp: 90 tablet, Rfl: 3  •  Coenzyme Q10 100 MG tablet, Take 100 mg by mouth Daily., Disp: , Rfl:   •  dilTIAZem CD (CARDIZEM CD) 180 MG 24 hr capsule, Take 1 capsule by mouth Daily for 30 days., Disp: 30 capsule, Rfl: 0  •  Entresto 49-51 MG tablet, Take 1 tablet by mouth 2 (two) times a day., Disp: 60 tablet, Rfl: 2  •  finasteride (PROSCAR) 5 MG tablet, Take 5 mg by mouth Daily., Disp: , Rfl:   •  fluticasone (FLONASE) 50 MCG/ACT nasal spray, 1 spray into the nostril(s) as  directed by provider Every Night., Disp: , Rfl:   •  Fluticasone Furoate (Arnuity Ellipta) 100 MCG/ACT aerosol powder , Inhale 1 puff Daily., Disp: 1 each, Rfl: 3  •  isosorbide mononitrate (IMDUR) 60 MG 24 hr tablet, Take 1 tablet by mouth every day, Disp: 90 tablet, Rfl: 0  •  levalbuterol (Xopenex HFA) 45 MCG/ACT inhaler, Inhale 1-2 puffs Every 4 (Four) Hours As Needed for Wheezing., Disp: 1 each, Rfl: 11  •  levalbuterol (Xopenex) 0.63 MG/3ML nebulizer solution, Take 1 ampule by nebulization Every 6 (Six) Hours As Needed for Wheezing for up to 30 days., Disp: 360 mL, Rfl: 0  •  Livalo 1 MG tablet tablet, Take 1 tablet by mouth every day (Patient taking differently: Take 1 mg by mouth Every Night.), Disp: 90 tablet, Rfl: 1  •  metoprolol succinate XL (TOPROL-XL) 25 MG 24 hr tablet, Take 3 tablets by mouth Every 12 (Twelve) Hours for 30 days., Disp: 180 tablet, Rfl: 0  •  montelukast (SINGULAIR) 10 MG tablet, Take 1 tablet by mouth Every Night for 30 days., Disp: 30 tablet, Rfl: 0  •  nitroglycerin (NITROSTAT) 0.4 MG SL tablet, Place 1 tablet under the tongue Every 5 (Five) Minutes As Needed for Chest Pain for up to 30 days. Take no more than 3 doses in 15 minutes., Disp: 30 tablet, Rfl: 0  •  pantoprazole (PROTONIX) 40 MG EC tablet, Take 1 tablet by mouth Daily., Disp: 30 tablet, Rfl: 3  •  tamsulosin (FLOMAX) 0.4 MG capsule 24 hr capsule, Take 1 capsule by mouth Daily. 1/2 hours following the same meal each day (Patient taking differently: Take 1 capsule by mouth Daily.), Disp: 90 capsule, Rfl: 4  •  tiotropium bromide-olodaterol (Stiolto Respimat) 2.5-2.5 MCG/ACT aerosol solution inhaler, Inhale 2 puffs Daily., Disp: 2 each, Rfl: 0  •  VITAMIN B COMPLEX-C PO, Take 1 tablet by mouth Daily., Disp: , Rfl:   •  Xarelto 15 MG tablet, Take 1 tablet by mouth every day (Patient taking differently: Take 15 mg by mouth Every Night.), Disp: 90 tablet, Rfl: 1  •  furosemide (LASIX) 40 MG tablet, Take 40 mg by mouth Daily.,  Disp: , Rfl:   •  potassium chloride 10 MEQ CR tablet, Take 2 tablets by mouth Daily., Disp: 30 tablet, Rfl: 11     Past Medical History:  Past Medical History:   Diagnosis Date   • Arthritis    • BPH (benign prostatic hyperplasia)    • CAD (coronary artery disease)    • CHF (congestive heart failure) ([facility])    • COPD (chronic obstructive pulmonary disease) ([facility])    • Coronary artery disease of bypass graft of native heart with stable angina pectoris ([facility]) 8/9/2021    (1) Coronary artery disease, status post coronary artery bypass grafting in the past. Cardiac catheterization done in July 2016 showed patent left internal mammary graft to the LAD and occluded saphenous venous graft. Native LAD is 100% occluded in the mid portion. Native circumflex artery has no significant disease. Native right coronary artery is also 100% occluded and it is a chronic total occl   • COVID-19 02/04/2021   • Diverticulitis 10/11/2019   • Dysphagia    • Essential hypertension 08/27/2020   • Frequent PVCs 8/28/2021   • GERD (gastroesophageal reflux disease)    • Gross hematuria    • HBP (high blood pressure)    • Long-term use of high-risk medication    • Lung disease    • Mixed hyperlipidemia 8/28/2021   • Myocardial infarction ([facility]) 07/2016   • OCD (obsessive compulsive disorder)    • Renal insufficiency 08/27/2020   • Rhinitis, allergic 06/05/2018   • Sore throat    • Stable angina ([facility])    • Typical atrial flutter ([facility]) 11/30/2018    s/p ablation by Dr. Aguilar    • Vertigo        Past Surgical History:  Past Surgical History:   Procedure Laterality Date   • BLADDER SURGERY     • CARDIAC CATHETERIZATION  07/2016   • CARDIAC CATHETERIZATION N/A 8/9/2021    Procedure: Left Heart Cath with possible angioplasty;  Surgeon: Jack Ladd MD;  Location: [facility] CATH INVASIVE LOCATION;  Service: Cardiovascular;  Laterality: N/A;  Please schedule the procedure with Dr. Jack Ladd MD on 8/9/2021   • CARDIAC ELECTROPHYSIOLOGY PROCEDURE N/A  "6/3/2022    Procedure: Ablation atrial fibrillation;  Surgeon: Irineo Aguilar MD;  Location:  [facility] CATH INVASIVE LOCATION;  Service: Cardiovascular;  Laterality: N/A;   • CARDIAC ELECTROPHYSIOLOGY PROCEDURE N/A 6/3/2022    Procedure: Ablation atrial flutter/CTI;  Surgeon: Irineo Aguilar MD;  Location:  [facility] CATH INVASIVE LOCATION;  Service: Cardiovascular;  Laterality: N/A;   • CARDIAC SURGERY      HEART BYPASS   • COLONOSCOPY     • CORONARY ARTERY BYPASS GRAFT     • CYSTOSCOPY  2019    WITH BILATERAL RETROGRADE PYELOGRAPHY   • ELECTRICAL CARDIOVERSION  2022        • ENDOSCOPY  2016   • PROSTATE SURGERY         Social History:  Social History     Socioeconomic History   • Marital status:    Tobacco Use   • Smoking status: Former Smoker     Packs/day: 0.50     Years: 40.00     Pack years: 20.00     Types: Cigarettes     Start date:      Quit date:      Years since quittin.4   • Smokeless tobacco: Never Used   Vaping Use   • Vaping Use: Never used   Substance and Sexual Activity   • Alcohol use: Not Currently   • Drug use: Never   • Sexual activity: Defer       Allergies:  Allergies   Allergen Reactions   • Carvedilol Swelling and Anaphylaxis   • Levaquin [Levofloxacin] Unknown - Low Severity       Immunizations:  Immunization History   Administered Date(s) Administered   • COVID-19 (NIKKY) 2021   • Fluzone High-Dose 65+yrs 2021   • Fluzone Split Quad (Multi-dose) 10/08/2019   • Influenza Quad Vaccine (Inpatient) 10/08/2019   • Influenza, Unspecified 10/08/2019   • Shingrix 2021, 2021          Objective:         Review of Systems   Constitutional: Positive for fatigue.   Respiratory: Positive for shortness of breath.    Cardiovascular: Positive for palpitations and leg swelling. Negative for chest pain.   All other systems reviewed and are negative.       /80   Pulse 76   Resp 20   Ht 177.8 cm (70\")   Wt 107 kg (235 lb)   BMI 33.72 " kg/m²     Constitutional:       Appearance: Not in distress.   Eyes:      Pupils: Pupils are equal, round, and reactive to light.   Neck:      Vascular: JVD elevated.   Pulmonary:      Effort: Pulmonary effort is normal.      Breath sounds: Normal breath sounds.   Cardiovascular:      Normal rate. Irregularly irregular rhythm.   Abdominal:      Palpations: Abdomen is soft.   Skin:     General: Skin is warm and dry.   Neurological:      General: No focal deficit present.      Mental Status: Alert and oriented to person, place and time.         In-Office Procedure(s):    ECG 12 Lead    Date/Time: 6/20/2022 12:28 PM  Performed by: Irineo Aguilar MD  Authorized by: Irineo Aguilar MD   Comparison: compared with previous ECG from 6/4/2022  Comparison to previous ECG: NSR, STT abnormality  Rhythm: atrial fibrillation  Other findings: non-specific ST-T wave changes    Clinical impression: abnormal EKG          ECG 12 Lead    (Results Pending)        ASCVD RIsk Score::  The ASCVD Risk score ([place] PHANI Jr., et al., 2013) failed to calculate for the following reasons:    The 2013 ASCVD risk score is only valid for ages 40 to 79    The patient has a prior MI or stroke diagnosis    Recent Radiology:          Lab Review:       Recent labs reviewed and interpreted for clinical significance and application          Assessment:          Diagnosis Plan   1. Atrial fibrillation, persistent (HCC)  ECG 12 Lead   2. Persistent atrial fibrillation (HCC)     3. S/P ablation of atrial fibrillation     4. Acute on chronic diastolic CHF (congestive heart failure) (HCC)     5. Ischemic cardiomyopathy     6. Typical atrial flutter (HCC)     7. Coronary artery disease of bypass graft of native heart with stable angina pectoris (HCC)            Plan:      1. Persistent AF - very symptomatic, now almost 3 weeks post ablation, still in AF.  Will continue amiodarone and Xarelto and plan for cardioversion in a couple of weeks.  He is also  still volume up, will increase Lasix and add K supplement.     2. CAD with prior CABG and PCI - no angina, on Plavix, Imdur, metoprolol and Entresto     3. HTN - controlled     4. HLD     5. COPD - albuterol, fluticasone and Stioloto     6. CKD3 - last creatinine 1.3, GFR 56     7. Atrial flutter - s/p ablation 2018     8. PVC's     9. GERD    RTC post cardioversion      Level of Care:                 Irineo Aguilar MD  06/20/22

## 2022-12-17 ENCOUNTER — HOSPITAL ENCOUNTER (EMERGENCY)
Facility: HOSPITAL | Age: 52
Discharge: HOME | End: 2022-12-17
Attending: EMERGENCY MEDICINE
Payer: MEDICAID

## 2022-12-17 VITALS
HEART RATE: 58 BPM | WEIGHT: 155 LBS | OXYGEN SATURATION: 98 % | TEMPERATURE: 98 F | RESPIRATION RATE: 16 BRPM | BODY MASS INDEX: 23.49 KG/M2 | SYSTOLIC BLOOD PRESSURE: 119 MMHG | DIASTOLIC BLOOD PRESSURE: 74 MMHG | HEIGHT: 68 IN

## 2022-12-17 DIAGNOSIS — R10.9 FLANK PAIN: Primary | ICD-10-CM

## 2022-12-17 LAB
ALBUMIN SERPL BCP-MCNC: 4.3 G/DL (ref 3.5–5.2)
ALP SERPL-CCNC: 82 U/L (ref 55–135)
ALT SERPL W/O P-5'-P-CCNC: 15 U/L (ref 10–44)
ANION GAP SERPL CALC-SCNC: 7 MMOL/L (ref 8–16)
AST SERPL-CCNC: 19 U/L (ref 10–40)
BASOPHILS # BLD AUTO: 0.02 K/UL (ref 0–0.2)
BASOPHILS NFR BLD: 0.4 % (ref 0–1.9)
BILIRUB SERPL-MCNC: 0.9 MG/DL (ref 0.1–1)
BILIRUB UR QL STRIP: NEGATIVE
BUN SERPL-MCNC: 16 MG/DL (ref 6–20)
CALCIUM SERPL-MCNC: 9.5 MG/DL (ref 8.7–10.5)
CHLORIDE SERPL-SCNC: 109 MMOL/L (ref 95–110)
CLARITY UR: CLEAR
CO2 SERPL-SCNC: 24 MMOL/L (ref 23–29)
COLOR UR: YELLOW
CREAT SERPL-MCNC: 1 MG/DL (ref 0.5–1.4)
DIFFERENTIAL METHOD: ABNORMAL
EOSINOPHIL # BLD AUTO: 0.1 K/UL (ref 0–0.5)
EOSINOPHIL NFR BLD: 1.7 % (ref 0–8)
ERYTHROCYTE [DISTWIDTH] IN BLOOD BY AUTOMATED COUNT: 13.2 % (ref 11.5–14.5)
EST. GFR  (NO RACE VARIABLE): >60 ML/MIN/1.73 M^2
GLUCOSE SERPL-MCNC: 79 MG/DL (ref 70–110)
GLUCOSE UR QL STRIP: NEGATIVE
HCT VFR BLD AUTO: 43.3 % (ref 37–48.5)
HGB BLD-MCNC: 14.6 G/DL (ref 12–16)
HGB UR QL STRIP: NEGATIVE
IMM GRANULOCYTES # BLD AUTO: 0 K/UL (ref 0–0.04)
IMM GRANULOCYTES NFR BLD AUTO: 0 % (ref 0–0.5)
KETONES UR QL STRIP: NEGATIVE
LEUKOCYTE ESTERASE UR QL STRIP: NEGATIVE
LIPASE SERPL-CCNC: 35 U/L (ref 4–60)
LYMPHOCYTES # BLD AUTO: 2.4 K/UL (ref 1–4.8)
LYMPHOCYTES NFR BLD: 51.5 % (ref 18–48)
MCH RBC QN AUTO: 30.7 PG (ref 27–31)
MCHC RBC AUTO-ENTMCNC: 33.7 G/DL (ref 32–36)
MCV RBC AUTO: 91 FL (ref 82–98)
MONOCYTES # BLD AUTO: 0.4 K/UL (ref 0.3–1)
MONOCYTES NFR BLD: 8.6 % (ref 4–15)
NEUTROPHILS # BLD AUTO: 1.8 K/UL (ref 1.8–7.7)
NEUTROPHILS NFR BLD: 37.8 % (ref 38–73)
NITRITE UR QL STRIP: NEGATIVE
NRBC BLD-RTO: 0 /100 WBC
PH UR STRIP: 7 [PH] (ref 5–8)
PLATELET # BLD AUTO: 211 K/UL (ref 150–450)
PMV BLD AUTO: 10.6 FL (ref 9.2–12.9)
POTASSIUM SERPL-SCNC: 3.9 MMOL/L (ref 3.5–5.1)
PROT SERPL-MCNC: 7.5 G/DL (ref 6–8.4)
PROT UR QL STRIP: NEGATIVE
RBC # BLD AUTO: 4.76 M/UL (ref 4–5.4)
SODIUM SERPL-SCNC: 140 MMOL/L (ref 136–145)
SP GR UR STRIP: 1.01 (ref 1–1.03)
URN SPEC COLLECT METH UR: NORMAL
UROBILINOGEN UR STRIP-ACNC: NEGATIVE EU/DL
WBC # BLD AUTO: 4.66 K/UL (ref 3.9–12.7)

## 2022-12-17 PROCEDURE — 99285 EMERGENCY DEPT VISIT HI MDM: CPT | Mod: 25

## 2022-12-17 PROCEDURE — 81003 URINALYSIS AUTO W/O SCOPE: CPT

## 2022-12-17 PROCEDURE — 85025 COMPLETE CBC W/AUTO DIFF WBC: CPT

## 2022-12-17 PROCEDURE — 63600175 PHARM REV CODE 636 W HCPCS

## 2022-12-17 PROCEDURE — 83690 ASSAY OF LIPASE: CPT

## 2022-12-17 PROCEDURE — 96361 HYDRATE IV INFUSION ADD-ON: CPT

## 2022-12-17 PROCEDURE — 96374 THER/PROPH/DIAG INJ IV PUSH: CPT

## 2022-12-17 PROCEDURE — 96375 TX/PRO/DX INJ NEW DRUG ADDON: CPT

## 2022-12-17 PROCEDURE — 80053 COMPREHEN METABOLIC PANEL: CPT

## 2022-12-17 PROCEDURE — 25000003 PHARM REV CODE 250

## 2022-12-17 RX ORDER — ORPHENADRINE CITRATE 30 MG/ML
60 INJECTION INTRAMUSCULAR; INTRAVENOUS
Status: COMPLETED | OUTPATIENT
Start: 2022-12-17 | End: 2022-12-17

## 2022-12-17 RX ORDER — MORPHINE SULFATE 4 MG/ML
4 INJECTION, SOLUTION INTRAMUSCULAR; INTRAVENOUS
Status: COMPLETED | OUTPATIENT
Start: 2022-12-17 | End: 2022-12-17

## 2022-12-17 RX ORDER — ORPHENADRINE CITRATE 100 MG/1
100 TABLET, EXTENDED RELEASE ORAL 2 TIMES DAILY
Qty: 20 TABLET | Refills: 0 | Status: SHIPPED | OUTPATIENT
Start: 2022-12-17 | End: 2022-12-27

## 2022-12-17 RX ORDER — KETOROLAC TROMETHAMINE 30 MG/ML
15 INJECTION, SOLUTION INTRAMUSCULAR; INTRAVENOUS
Status: COMPLETED | OUTPATIENT
Start: 2022-12-17 | End: 2022-12-17

## 2022-12-17 RX ORDER — MELOXICAM 7.5 MG/1
7.5 TABLET ORAL DAILY
Qty: 20 TABLET | Refills: 0 | Status: SHIPPED | OUTPATIENT
Start: 2022-12-17

## 2022-12-17 RX ORDER — ONDANSETRON 2 MG/ML
4 INJECTION INTRAMUSCULAR; INTRAVENOUS
Status: COMPLETED | OUTPATIENT
Start: 2022-12-17 | End: 2022-12-17

## 2022-12-17 RX ORDER — LIDOCAINE 50 MG/G
1 PATCH TOPICAL ONCE
Qty: 15 PATCH | Refills: 0 | Status: SHIPPED | OUTPATIENT
Start: 2022-12-17 | End: 2022-12-17

## 2022-12-17 RX ADMIN — ONDANSETRON 4 MG: 2 INJECTION INTRAMUSCULAR; INTRAVENOUS at 04:12

## 2022-12-17 RX ADMIN — KETOROLAC TROMETHAMINE 15 MG: 30 INJECTION, SOLUTION INTRAMUSCULAR; INTRAVENOUS at 02:12

## 2022-12-17 RX ADMIN — MORPHINE SULFATE 4 MG: 4 INJECTION INTRAVENOUS at 04:12

## 2022-12-17 RX ADMIN — ORPHENADRINE CITRATE 60 MG: 30 INJECTION INTRAMUSCULAR; INTRAVENOUS at 04:12

## 2022-12-17 RX ADMIN — SODIUM CHLORIDE 1000 ML: 0.9 INJECTION, SOLUTION INTRAVENOUS at 02:12

## 2022-12-17 NOTE — ED TRIAGE NOTES
Flank Pain (Pt to ED with complaints of bilateral flank pain that started last night. Pt states has hx of kidney stones. Denies burning while urinating. Pt denies bowel/bladder dysfunction. Reports taking naproxen with no relief. )

## 2022-12-17 NOTE — DISCHARGE INSTRUCTIONS
Please return to the Emergency Department for any new or worsening symptoms including: numbness/tingling to groin, bladder/bowel incontinence, fever, chest pain, shortness of breath, loss of consciousness, dizziness, weakness, or any other concerns.     Please follow up with your Primary Care Provider within in the week. If you do not have one, you may contact the one listed on your discharge paperwork or you may also call the [facility] Appointment Desk at [phone] to schedule an appointment with one.     Please take all medication as prescribed.

## 2022-12-17 NOTE — ED PROVIDER NOTES
Encounter Date: 12/17/2022    SCRIBE #1 NOTE: IVickie, am scribing for, and in the presence of,  Samantha Li PA-C. I have scribed the following portions of the note - Other sections scribed: HPI, ROS.     History     Chief Complaint   Patient presents with    Flank Pain     Pt to ED with complaints of bilateral flank pain that started last night. Pt states has hx of kidney stones. Denies burning while urinating. Pt denies bowel/bladder dysfunction. Reports taking naproxen with no relief.      Surjit Carranza is a 52 y.o. female, with a past medical history of kidney stones, who presents to the ED with severe bilateral flank pain that began at 9 pm 1 day ago. Patient states the pain is 8/10. Patient reports the pain was so bad last night she had to crawl to bed from the living room. Patient has an associated symptom of headache. Patient endorsed naproxen and flexeril with no relief. No other exacerbating or alleviating factors. Patient denies bladder/bowel incontinence, saddle anesthesia, dysuria, hematuria, nausea, vomiting, diarrhea, abdominal pain, or other associated symptoms. Patient is postmenopausal. No known allergies.       The history is provided by the patient. No  was used.   Review of patient's allergies indicates:  No Known Allergies  Past Medical History:   Diagnosis Date    Abnormal Pap smear of cervix     years ago     Anxiety     Breast cyst     Chlamydia     Depression     Herpes simplex virus (HSV) infection     HPV (human papilloma virus) infection      Past Surgical History:   Procedure Laterality Date    CYST REMOVAL      PELVIC LAPAROSCOPY      TUBAL LIGATION       Family History   Problem Relation Age of Onset    Breast cancer Neg Hx     Colon cancer Neg Hx     Ovarian cancer Neg Hx      Social History     Tobacco Use    Smoking status: Every Day     Packs/day: 0.50     Types: Cigarettes    Smokeless tobacco: Former   Substance Use Topics    Alcohol use:  Yes     Comment: socially    Drug use: No     Review of Systems   Constitutional:  Negative for fever.   HENT:  Negative for sore throat.    Eyes:  Negative for visual disturbance.   Respiratory:  Negative for shortness of breath.    Cardiovascular:  Negative for chest pain.   Gastrointestinal:  Negative for abdominal pain, diarrhea, nausea and vomiting.   Genitourinary:  Positive for flank pain. Negative for dysuria and hematuria.        (-) bladder/bowel incontinence  (-) saddle anesthesia   Musculoskeletal:  Negative for neck pain.   Skin:  Negative for rash.   Neurological:  Positive for headaches.     Physical Exam     Initial Vitals [12/17/22 1407]   BP Pulse Resp Temp SpO2   133/77 62 18 97.7 °F (36.5 °C) 99 %      MAP       --         Physical Exam    Nursing note and vitals reviewed.  Constitutional: She appears well-developed and well-nourished.  Non-toxic appearance. She does not appear ill.   Patient appears uncomfortable.   HENT:   Head: Normocephalic and atraumatic.   Mouth/Throat: Mucous membranes are normal.   Eyes: Conjunctivae and EOM are normal.   Neck: Neck supple.   Normal range of motion.   Full passive range of motion without pain.     Cardiovascular:  Normal rate and regular rhythm.           Pulses:       Radial pulses are 2+ on the right side and 2+ on the left side.   Pulmonary/Chest: Effort normal and breath sounds normal. No respiratory distress.   Abdominal: Abdomen is soft. Bowel sounds are normal. She exhibits no distension. There is no abdominal tenderness. There is no rebound and no guarding.   Musculoskeletal:         General: Normal range of motion.      Cervical back: Full passive range of motion without pain, normal range of motion and neck supple. No rigidity.      Comments: No midline tenderness to cervical, thoracic, or lumbar spine.  No bony step-offs.  Full range of motion of bilateral upper and lower extremities.  Strength and sensation intact bilateral upper and lower  extremities.  No erythema, edema, bruising, rash, or cellulitis patient's back.     Neurological: She is alert. No cranial nerve deficit.   Neuro intact.  Strength and sensation intact bilateral upper and lower extremities.   Skin: Skin is warm and dry.   Psychiatric: She has a normal mood and affect.       ED Course   Procedures  Labs Reviewed   CBC W/ AUTO DIFFERENTIAL - Abnormal; Notable for the following components:       Result Value    Gran % 37.8 (*)     Lymph % 51.5 (*)     All other components within normal limits   COMPREHENSIVE METABOLIC PANEL - Abnormal; Notable for the following components:    Anion Gap 7 (*)     All other components within normal limits   URINALYSIS, REFLEX TO URINE CULTURE    Narrative:     Specimen Source->Urine   LIPASE          Imaging Results              CT Renal Stone Study ABD Pelvis WO (Final result)  Result time 12/17/22 16:04:00      Final result by Julius Modi MD (12/17/22 16:04:00)                   Impression:      No evidence of nephrolithiasis or obstructive uropathy.    Diverticulosis coli without evidence of acute diverticulitis.    Additional findings as above.      Electronically signed by: Julius Modi MD  Date:    12/17/2022  Time:    16:04               Narrative:    EXAMINATION:  CT RENAL STONE STUDY ABD PELVIS WO    CLINICAL HISTORY:  Flank pain, kidney stone suspected;    TECHNIQUE:  Axial CT scan of the abdomen and pelvis was obtained from the level of the hemidiaphragms to the pubic symphysis without intravenous contrast. Coronal and sagittal reformats were obtained. The study was performed using a renal stone protocol.  Therefore, no intravenous or oral IV contrast was administered.    COMPARISON:  03/22/2022.    FINDINGS:  There are no pleural effusions.  There is no evidence of a pneumothorax.  No airspace opacity is present.  No pulmonary nodules identified.    The heart is unremarkable.  There is normal tapering of the abdominal aorta.  There are  calcifications along the course of the abdominal aorta is branch vessels.  There is no evidence of lymphadenopathy in the abdomen or pelvis.    The esophagus, stomach, and duodenum are within normal limits.  The small bowel loops are unremarkable.  The appendix is within normal limits.  There is colonic diverticula without evidence of acute diverticulitis.  There is no CT evidence of bowel obstruction.    There is a stable 3.1 cm simple cyst in the right hepatic lobe.  The remainder of the liver is unremarkable.  The gallbladder is within normal limits.  The biliary tree is unremarkable.  The spleen is borderline enlarged.  The pancreas is within normal limits.  The adrenal glands are unremarkable.    The kidneys are unremarkable.  The ureters are unremarkable.  The urinary bladder is within normal limits.  There is no stone along the collecting system.  The uterus and structures are within limits.    There is no evidence of free fluid in the abdomen pelvis.  There is no evidence of free air.  There is no evidence of pneumatosis.  No portal venous air is identified.    The psoas margins are unremarkable.  The abdominal wall is within normal limits.  There are degenerative changes in the osseous structures.                                       Medications   sodium chloride 0.9% bolus 1,000 mL 1,000 mL (0 mLs Intravenous Stopped 12/17/22 1521)   ketorolac injection 15 mg (15 mg Intravenous Given 12/17/22 1419)   morphine injection 4 mg (4 mg Intravenous Given 12/17/22 1633)   ondansetron injection 4 mg (4 mg Intravenous Given 12/17/22 1632)   orphenadrine injection 60 mg (60 mg Intravenous Given 12/17/22 1635)     Medical Decision Making:   History:   Old Medical Records: I decided to obtain old medical records.  Clinical Tests:   Lab Tests: Ordered and Reviewed  Radiological Study: Ordered and Reviewed  ED Management:  This is a 52 y.o. female, with a past medical history of kidney stones, who presents to the ED  with severe bilateral flank pain that began at 9 pm 1 day ago.  On physical exam, patient appears uncomfortable but in no acute distress.  Nontoxic appearing.  Lungs are clear to auscultation bilaterally.  Abdomen is soft and nontender.  No guarding, rigidity, rebound.  2+ radial pulses bilaterally.  Posterior oropharynx is not erythematous.  No edema or exudate.  Uvula midline.  Bilateral tympanic membrane is normal.  No erythema, bulging, or perforations.  No midline tenderness to cervical, thoracic, or lumbar spine.  No bony step-offs.  Full range of motion of bilateral upper and lower extremities.  Strength and sensation intact bilateral upper and lower extremities.  No erythema, edema, bruising, rash, or cellulitis patient's back.  No CVA tenderness bilaterally.  Neuro intact.  Strength and sensation intact bilateral upper and lower extremities.  Full range of motion of neck.  No neck rigidity.  Fluids, Toradol ordered.  Will reassess.  CBC and CMP unremarkable.  Doubt electrolyte imbalance.  Lipase unremarkable.  Doubt pancreatitis.  UA unremarkable.  Doubt cystitis.  Upon reassessment, patient still reports some pain.  Morphine, Norflex, Zofran ordered.  CT revealed No evidence of nephrolithiasis or obstructive uropathy. Diverticulosis coli without evidence of acute diverticulitis.  I believe patient's symptoms are musculoskeletal in nature.  Will discharge patient on Mobic, Norflex, and Lidoderm patches.  Ambulatory referral to back and spine specialist ordered.  Urged prompt follow-up with back and spine specialist and PCP for further evaluation.    Strict return precautions given. I discussed with the patient/family the diagnosis, treatment plan, indications for return to the emergency department, and for expected follow-up. The patient/family verbalized an understanding. The patient/family is asked if there are any questions or concerns. We discuss the case, until all issues are addressed to the  patient/family's satisfaction. Patient/family understands and is agreeable to the plan. Patient is stable and ready for discharge.          Scribe Attestation:   Scribe #1: I performed the above scribed service and the documentation accurately describes the services I performed. I attest to the accuracy of the note.                   Clinical Impression:   Final diagnoses:  [R10.9] Flank pain (Primary)        ED Disposition Condition    Discharge Stable          ED Prescriptions       Medication Sig Dispense Start Date End Date Auth. Provider    meloxicam (MOBIC) 7.5 MG tablet Take 1 tablet (7.5 mg total) by mouth once daily. 20 tablet 12/17/2022 -- Samantha Li PA-C    orphenadrine (NORFLEX) 100 mg tablet Take 1 tablet (100 mg total) by mouth 2 (two) times daily. for 10 days 20 tablet 12/17/2022 12/27/2022 Samantha Li PA-C    LIDOcaine (LIDODERM) 5 % (Expires today) Place 1 patch onto the skin once. Remove & Discard patch within 12 hours or as directed by MD for 1 dose 15 patch 12/17/2022 12/17/2022 Samantha Li PA-C          Follow-up Information       Follow up With Specialties Details Why Contact Info    [facility]  Schedule an appointment as soon as possible for a visit in 2 days for further evaluation [address] [facility] [address]  [phone]      [facility] Emergency Dept Emergency Medicine In 2 days If symptoms worsen [address] [facility] [address] [phone]        I, Samantha Li, personally performed the services described in this documentation. All medical record entries made by the scribe were at my direction and in my presence. I have reviewed the chart and agree that the record reflects my personal performance and is accurate and complete.       Samantha Li PA-C  12/17/22 2127

## 2023-01-18 ENCOUNTER — TELEPHONE (OUTPATIENT)
Dept: GASTROENTEROLOGY | Facility: CLINIC | Age: 53
End: 2023-01-18
Payer: MEDICAID

## 2023-01-18 NOTE — TELEPHONE ENCOUNTER
----- Message from Patricia Addison sent at 1/18/2023  2:14 PM CST -----  Regarding: appoitment  Contact: [phone]  Caller Ayesha [phone]  from [facility] calling to schedule  a appointment for Z12.11,Z12.12 (ICD-10-CM) - Screening for colorectal cancer . Pt has a referral that is  pending in the system .Please call  the pt to schedule and discuss further.

## 2023-07-10 ENCOUNTER — HOSPITAL ENCOUNTER (EMERGENCY)
Facility: HOSPITAL | Age: 53
Discharge: HOME | End: 2023-07-10
Attending: EMERGENCY MEDICINE
Payer: MEDICAID

## 2023-07-10 VITALS
HEART RATE: 60 BPM | SYSTOLIC BLOOD PRESSURE: 125 MMHG | RESPIRATION RATE: 18 BRPM | BODY MASS INDEX: 23.49 KG/M2 | WEIGHT: 155 LBS | DIASTOLIC BLOOD PRESSURE: 70 MMHG | HEIGHT: 68 IN | OXYGEN SATURATION: 100 % | TEMPERATURE: 98 F

## 2023-07-10 DIAGNOSIS — R07.89 CHEST WALL PAIN: Primary | ICD-10-CM

## 2023-07-10 DIAGNOSIS — R07.9 CHEST PAIN: ICD-10-CM

## 2023-07-10 LAB
ALBUMIN SERPL BCP-MCNC: 4.2 G/DL (ref 3.5–5.2)
ALP SERPL-CCNC: 102 U/L (ref 55–135)
ALT SERPL W/O P-5'-P-CCNC: 16 U/L (ref 10–44)
ANION GAP SERPL CALC-SCNC: 9 MMOL/L (ref 8–16)
AST SERPL-CCNC: 20 U/L (ref 10–40)
BASOPHILS # BLD AUTO: 0.04 K/UL (ref 0–0.2)
BASOPHILS NFR BLD: 0.5 % (ref 0–1.9)
BILIRUB SERPL-MCNC: 1.4 MG/DL (ref 0.1–1)
BNP SERPL-MCNC: 46 PG/ML (ref 0–99)
BUN SERPL-MCNC: 14 MG/DL (ref 6–20)
CALCIUM SERPL-MCNC: 9.9 MG/DL (ref 8.7–10.5)
CHLORIDE SERPL-SCNC: 105 MMOL/L (ref 95–110)
CO2 SERPL-SCNC: 22 MMOL/L (ref 23–29)
CREAT SERPL-MCNC: 0.9 MG/DL (ref 0.5–1.4)
DIFFERENTIAL METHOD: NORMAL
EOSINOPHIL # BLD AUTO: 0.1 K/UL (ref 0–0.5)
EOSINOPHIL NFR BLD: 1.9 % (ref 0–8)
ERYTHROCYTE [DISTWIDTH] IN BLOOD BY AUTOMATED COUNT: 13.2 % (ref 11.5–14.5)
EST. GFR  (NO RACE VARIABLE): >60 ML/MIN/1.73 M^2
GLUCOSE SERPL-MCNC: 94 MG/DL (ref 70–110)
HCT VFR BLD AUTO: 43.7 % (ref 37–48.5)
HGB BLD-MCNC: 14.7 G/DL (ref 12–16)
IMM GRANULOCYTES # BLD AUTO: 0.01 K/UL (ref 0–0.04)
IMM GRANULOCYTES NFR BLD AUTO: 0.1 % (ref 0–0.5)
LYMPHOCYTES # BLD AUTO: 2.1 K/UL (ref 1–4.8)
LYMPHOCYTES NFR BLD: 28.2 % (ref 18–48)
MCH RBC QN AUTO: 30 PG (ref 27–31)
MCHC RBC AUTO-ENTMCNC: 33.6 G/DL (ref 32–36)
MCV RBC AUTO: 89 FL (ref 82–98)
MONOCYTES # BLD AUTO: 0.5 K/UL (ref 0.3–1)
MONOCYTES NFR BLD: 6.2 % (ref 4–15)
NEUTROPHILS # BLD AUTO: 4.6 K/UL (ref 1.8–7.7)
NEUTROPHILS NFR BLD: 63.1 % (ref 38–73)
NRBC BLD-RTO: 0 /100 WBC
PLATELET # BLD AUTO: 209 K/UL (ref 150–450)
PMV BLD AUTO: 10.7 FL (ref 9.2–12.9)
POTASSIUM SERPL-SCNC: 4.1 MMOL/L (ref 3.5–5.1)
PROT SERPL-MCNC: 7.3 G/DL (ref 6–8.4)
RBC # BLD AUTO: 4.9 M/UL (ref 4–5.4)
SODIUM SERPL-SCNC: 136 MMOL/L (ref 136–145)
TROPONIN I SERPL DL<=0.01 NG/ML-MCNC: <0.006 NG/ML (ref 0–0.03)
TROPONIN I SERPL DL<=0.01 NG/ML-MCNC: <0.006 NG/ML (ref 0–0.03)
WBC # BLD AUTO: 7.31 K/UL (ref 3.9–12.7)

## 2023-07-10 PROCEDURE — 25000242 PHARM REV CODE 250 ALT 637 W/ HCPCS: Performed by: EMERGENCY MEDICINE

## 2023-07-10 PROCEDURE — 25000003 PHARM REV CODE 250: Performed by: EMERGENCY MEDICINE

## 2023-07-10 PROCEDURE — 83880 ASSAY OF NATRIURETIC PEPTIDE: CPT | Performed by: EMERGENCY MEDICINE

## 2023-07-10 PROCEDURE — 93010 ELECTROCARDIOGRAM REPORT: CPT | Mod: ,,, | Performed by: INTERNAL MEDICINE

## 2023-07-10 PROCEDURE — 85025 COMPLETE CBC W/AUTO DIFF WBC: CPT | Performed by: EMERGENCY MEDICINE

## 2023-07-10 PROCEDURE — 93005 ELECTROCARDIOGRAM TRACING: CPT

## 2023-07-10 PROCEDURE — 99285 EMERGENCY DEPT VISIT HI MDM: CPT | Mod: 25

## 2023-07-10 PROCEDURE — 93010 EKG 12-LEAD: ICD-10-PCS | Mod: ,,, | Performed by: INTERNAL MEDICINE

## 2023-07-10 PROCEDURE — 84484 ASSAY OF TROPONIN QUANT: CPT | Mod: 91 | Performed by: EMERGENCY MEDICINE

## 2023-07-10 PROCEDURE — 94640 AIRWAY INHALATION TREATMENT: CPT

## 2023-07-10 PROCEDURE — 80053 COMPREHEN METABOLIC PANEL: CPT | Performed by: EMERGENCY MEDICINE

## 2023-07-10 RX ORDER — ACETAMINOPHEN 500 MG
1000 TABLET ORAL
Status: COMPLETED | OUTPATIENT
Start: 2023-07-10 | End: 2023-07-10

## 2023-07-10 RX ORDER — METHOCARBAMOL 500 MG/1
1000 TABLET, FILM COATED ORAL 3 TIMES DAILY
Qty: 30 TABLET | Refills: 0 | Status: SHIPPED | OUTPATIENT
Start: 2023-07-10 | End: 2023-07-10 | Stop reason: SDUPTHER

## 2023-07-10 RX ORDER — LIDOCAINE 50 MG/G
1 PATCH TOPICAL DAILY
Qty: 15 PATCH | Refills: 0 | Status: SHIPPED | OUTPATIENT
Start: 2023-07-10 | End: 2023-07-10 | Stop reason: SDUPTHER

## 2023-07-10 RX ORDER — LIDOCAINE 50 MG/G
1 PATCH TOPICAL ONCE
Status: DISCONTINUED | OUTPATIENT
Start: 2023-07-10 | End: 2023-07-10 | Stop reason: HOSPADM

## 2023-07-10 RX ORDER — IPRATROPIUM BROMIDE AND ALBUTEROL SULFATE 2.5; .5 MG/3ML; MG/3ML
3 SOLUTION RESPIRATORY (INHALATION)
Status: COMPLETED | OUTPATIENT
Start: 2023-07-10 | End: 2023-07-10

## 2023-07-10 RX ORDER — LIDOCAINE 50 MG/G
1 PATCH TOPICAL DAILY
Qty: 15 PATCH | Refills: 0 | Status: SHIPPED | OUTPATIENT
Start: 2023-07-10

## 2023-07-10 RX ORDER — IBUPROFEN 400 MG/1
400 TABLET ORAL EVERY 6 HOURS PRN
Qty: 20 TABLET | Refills: 0 | Status: SHIPPED | OUTPATIENT
Start: 2023-07-10 | End: 2023-12-18 | Stop reason: ALTCHOICE

## 2023-07-10 RX ORDER — IBUPROFEN 400 MG/1
400 TABLET ORAL EVERY 6 HOURS PRN
Qty: 20 TABLET | Refills: 0 | Status: SHIPPED | OUTPATIENT
Start: 2023-07-10 | End: 2023-07-10 | Stop reason: SDUPTHER

## 2023-07-10 RX ORDER — METHOCARBAMOL 500 MG/1
1000 TABLET, FILM COATED ORAL 3 TIMES DAILY
Qty: 30 TABLET | Refills: 0 | Status: SHIPPED | OUTPATIENT
Start: 2023-07-10 | End: 2023-07-15

## 2023-07-10 RX ADMIN — LIDOCAINE PATCH 5% 1 PATCH: 700 PATCH TOPICAL at 12:07

## 2023-07-10 RX ADMIN — ACETAMINOPHEN 1000 MG: 500 TABLET ORAL at 12:07

## 2023-07-10 RX ADMIN — IPRATROPIUM BROMIDE AND ALBUTEROL SULFATE 3 ML: .5; 3 SOLUTION RESPIRATORY (INHALATION) at 12:07

## 2023-07-10 NOTE — ED TRIAGE NOTES
Pt c/o continuous chest pain/tightness accompanied by intermittent SOB and upper back pain x 1 week. Pt states she just left urgent care and was advised to come to the ED for further evaluation. Pt denies N/V, HA, bladder/bowel issues.   Pt AAOX4

## 2023-07-10 NOTE — ED PROVIDER NOTES
Encounter Date: 7/10/2023       History     Chief Complaint   Patient presents with    Chest Pain     Pt c/o continuous chest pain/rightness accompanied by intermittent SOB x 1 week. Pt states she just left urgent care and was advised to come to the ED for further evaluation. Pt denies radiating pain, n/v/d.     HPI    53-year-old female with past medical history of anxiety, depression presents with continuous chest pain with some radiation to the left chest wall.  Worse to move, move her left arm.  She reports previously having seen a cardiologist in the past however reports nothing really came with this.  She reports the chest pain has been going on for around 2 weeks.  She reports going to the urgent care earlier today and was directed to the emergency department for further evaluation.  She denies daily alcohol use, reports about half a pack per day smoking history.  Patient reports it is not worse to take a deep breath in, denies any further complaints.    Review of patient's allergies indicates:  No Known Allergies  Past Medical History:   Diagnosis Date    Abnormal Pap smear of cervix     years ago     Anxiety     Breast cyst     Chlamydia     Depression     Herpes simplex virus (HSV) infection     HPV (human papilloma virus) infection      Past Surgical History:   Procedure Laterality Date    CYST REMOVAL      PELVIC LAPAROSCOPY      TUBAL LIGATION       Family History   Problem Relation Age of Onset    Breast cancer Neg Hx     Colon cancer Neg Hx     Ovarian cancer Neg Hx      Social History     Tobacco Use    Smoking status: Every Day     Packs/day: 0.50     Types: Cigarettes    Smokeless tobacco: Former   Substance Use Topics    Alcohol use: Yes     Comment: socially    Drug use: No     Review of Systems   Constitutional: Negative.    HENT: Negative.     Eyes: Negative.    Respiratory: Negative.     Cardiovascular:  Positive for chest pain.   Gastrointestinal: Negative.    Genitourinary: Negative.     Musculoskeletal: Negative.    Skin: Negative.    Neurological: Negative.      Physical Exam     Initial Vitals [07/10/23 1157]   BP Pulse Resp Temp SpO2   129/78 76 19 98.2 °F (36.8 °C) 99 %      MAP       --         Physical Exam    Nursing note and vitals reviewed.  Constitutional: She appears well-developed and well-nourished. She is not diaphoretic. No distress.   HENT:   Head: Normocephalic and atraumatic.   Nose: Nose normal.   Eyes: EOM are normal. Pupils are equal, round, and reactive to light.   Neck: Neck supple. No JVD present.   Normal range of motion.  Cardiovascular:  Normal rate, regular rhythm, normal heart sounds and intact distal pulses.           Pulmonary/Chest: Breath sounds normal. No stridor. No respiratory distress. She has no wheezes. She has no rhonchi. She has no rales. She exhibits tenderness (ttp overleft sternal border, no overlying skin changes noted).   Abdominal: Abdomen is soft. Bowel sounds are normal. She exhibits no distension. There is no abdominal tenderness.   Musculoskeletal:         General: No tenderness or edema. Normal range of motion.      Cervical back: Normal range of motion and neck supple.     Neurological: She is alert and oriented to person, place, and time. She has normal strength.   Skin: Skin is warm and dry. Capillary refill takes less than 2 seconds. No rash noted. No erythema.       ED Course   Procedures  Labs Reviewed   COMPREHENSIVE METABOLIC PANEL - Abnormal; Notable for the following components:       Result Value    CO2 22 (*)     Total Bilirubin 1.4 (*)     All other components within normal limits   CBC W/ AUTO DIFFERENTIAL   TROPONIN I   TROPONIN I   B-TYPE NATRIURETIC PEPTIDE        ECG Results              EKG 12-lead (Final result)  Result time 07/10/23 19:35:08      Final result by Interface, Lab In [facility] (07/10/23 19:35:08)                   Narrative:    Test Reason : R07.9,    Vent. Rate : 071 BPM     Atrial Rate : 071 BPM     P-R Int :  114 ms          QRS Dur : 094 ms      QT Int : 388 ms       P-R-T Axes : 057 066 058 degrees     QTc Int : 421 ms    Normal sinus rhythm  Nonspecific ST abnormality  Abnormal ECG  When compared with ECG of 05-OCT-2022 12:15,  No significant change was found  Confirmed by Kannan Conteh MD ([id number]) on 7/10/2023 7:34:53 PM    Referred By: [email or web address]   SELF           Confirmed By:Kannan Conteh MD                                  Imaging Results              X-Ray Chest AP Portable (Final result)  Result time 07/10/23 12:39:36      Final result by Corby Pacheco MD (07/10/23 12:39:36)                   Impression:      1. Coarse interstitial attenuation, nonspecific.  No large focal consolidation.      Electronically signed by: Corby Pacheco MD  Date:    07/10/2023  Time:    12:39               Narrative:    EXAMINATION:  XR CHEST AP PORTABLE    CLINICAL HISTORY:  Chest Pain;    TECHNIQUE:  Single frontal view of the chest was performed.    COMPARISON:  10/05/2022    FINDINGS:  The cardiomediastinal silhouette is not enlarged.  There is no pleural effusion.  The trachea is midline.  The lungs are symmetrically expanded bilaterally with coarse interstitial attenuation, accentuated by habitus..  No large focal consolidation seen.  There is no pneumothorax.  The osseous structures are remarkable for degenerative change..                                       Medications   acetaminophen tablet 1,000 mg (1,000 mg Oral Given 7/10/23 1251)   albuterol-ipratropium 2.5 mg-0.5 mg/3 mL nebulizer solution 3 mL (3 mLs Nebulization Given 7/10/23 1255)                      MDM:    53 y.o.female with PMHx as noted above, presents with chest pain. Physical exam as noted above.  ED workup remarkable for EKG-normal sinus rhythm-rate of 71 beats per minute, nonspecific ST and T-wave changes noted, similar to prior EKG,  MS, no STEMI.  Chest x-ray unremarkable, BNP 46, troponin negative/negative, CMP with T bili 1.4, creatinine 0.9,  CBC within normal limits. Pt presentation consistent with chest pain, suspected to be musculoskeletal given tenderness signed reproducibility on exam, negative workup otherwise.  Patient will be referred to Cardiology nonetheless.  At this time given patient's history, physical exam, and ED workup do not suspect arrhythmia, MI/ACS, PE, PTX, aortic dissection, pericarditis, PNA, shingles, or any further malignant cause.  Discussed diagnosis and further treatment with patient including f/u, return precautions given and all questions answered.  Patient in understanding of plan.  Pt discharged to home improved and stable.              Clinical Impression:   Final diagnoses:  [R07.9] Chest pain  [R07.89] Chest wall pain (Primary)        ED Disposition Condition    Discharge Stable          ED Prescriptions       Medication Sig Dispense Start Date End Date Auth. Provider    ibuprofen (ADVIL,MOTRIN) 400 MG tablet  (Status: Discontinued) Take 1 tablet (400 mg total) by mouth every 6 (six) hours as needed for Other. 20 tablet 7/10/2023 7/10/2023 Conner Washington MD    LIDOcaine (LIDODERM) 5 %  (Status: Discontinued) Place 1 patch onto the skin once daily. Remove & Discard patch within 12 hours or as directed by MD 15 patch 7/10/2023 7/10/2023 Conner Washington MD    methocarbamoL (ROBAXIN) 500 MG Tab  (Status: Discontinued) Take 2 tablets (1,000 mg total) by mouth 3 (three) times daily. for 5 days 30 tablet 7/10/2023 7/10/2023 Conner Washington MD    ibuprofen (ADVIL,MOTRIN) 400 MG tablet  (Status: Discontinued) Take 1 tablet (400 mg total) by mouth every 6 (six) hours as needed for Other. 20 tablet 7/10/2023 7/10/2023 Conner Washington MD    LIDOcaine (LIDODERM) 5 %  (Status: Discontinued) Place 1 patch onto the skin once daily. Remove & Discard patch within 12 hours or as directed by MD 15 patch 7/10/2023 7/10/2023 Conner Washington MD    methocarbamoL (ROBAXIN) 500 MG Tab  (Status: Discontinued) Take 2  tablets (1,000 mg total) by mouth 3 (three) times daily. for 5 days 30 tablet 7/10/2023 7/10/2023 Conner Washington MD    ibuprofen (ADVIL,MOTRIN) 400 MG tablet Take 1 tablet (400 mg total) by mouth every 6 (six) hours as needed for Other. 20 tablet 7/10/2023 -- Conner Washington MD    LIDOcaine (LIDODERM) 5 % Place 1 patch onto the skin once daily. Remove & Discard patch within 12 hours or as directed by MD 15 patch 7/10/2023 -- Conner Washington MD    methocarbamoL (ROBAXIN) 500 MG Tab Take 2 tablets (1,000 mg total) by mouth 3 (three) times daily. for 5 days 30 tablet 7/10/2023 7/15/2023 Conner Washington MD          Follow-up Information       Follow up With Specialties Details Why Contact Info    [facility] Emergency Dept Emergency Medicine Go to  If symptoms worsen [address] [facility] [id number] [phone]    Diego Rivera MD Cardiology, Interventional Cardiology Schedule an appointment as soon as possible for a visit   [address]  [address]  [facility] [address]  [phone]               Conner Washington MD  07/11/23 2075

## 2023-12-18 PROBLEM — N95.1 MENOPAUSAL SYMPTOMS: Status: ACTIVE | Noted: 2019-10-24

## 2023-12-18 PROBLEM — Z12.11 SCREEN FOR COLON CANCER: Status: ACTIVE | Noted: 2023-08-31

## 2023-12-18 PROBLEM — F41.9 ANXIETY: Status: ACTIVE | Noted: 2023-11-07

## 2023-12-18 PROBLEM — F17.210 CIGARETTE SMOKER: Status: ACTIVE | Noted: 2019-07-16
